# Patient Record
Sex: FEMALE | Race: ASIAN | NOT HISPANIC OR LATINO | Employment: UNEMPLOYED | URBAN - METROPOLITAN AREA
[De-identification: names, ages, dates, MRNs, and addresses within clinical notes are randomized per-mention and may not be internally consistent; named-entity substitution may affect disease eponyms.]

---

## 2019-11-15 ENCOUNTER — OFFICE VISIT (OUTPATIENT)
Dept: OBGYN CLINIC | Facility: CLINIC | Age: 49
End: 2019-11-15
Payer: COMMERCIAL

## 2019-11-15 VITALS
BODY MASS INDEX: 31.51 KG/M2 | HEIGHT: 64 IN | SYSTOLIC BLOOD PRESSURE: 132 MMHG | HEART RATE: 71 BPM | WEIGHT: 184.6 LBS | DIASTOLIC BLOOD PRESSURE: 83 MMHG

## 2019-11-15 DIAGNOSIS — M76.32 IT BAND SYNDROME, LEFT: ICD-10-CM

## 2019-11-15 DIAGNOSIS — M25.562 LEFT KNEE PAIN, UNSPECIFIED CHRONICITY: Primary | ICD-10-CM

## 2019-11-15 PROCEDURE — 99203 OFFICE O/P NEW LOW 30 MIN: CPT | Performed by: ORTHOPAEDIC SURGERY

## 2019-11-15 RX ORDER — DIPHENOXYLATE HYDROCHLORIDE AND ATROPINE SULFATE 2.5; .025 MG/1; MG/1
1 TABLET ORAL DAILY
COMMUNITY

## 2019-11-15 RX ORDER — MELATONIN
1000 DAILY
COMMUNITY

## 2019-11-15 RX ORDER — METFORMIN HYDROCHLORIDE 500 MG/1
TABLET, EXTENDED RELEASE ORAL
Refills: 1 | COMMUNITY
Start: 2019-11-01

## 2020-10-26 LAB
COLOGUARD RESULT REPORTABLE: NEGATIVE
EXTERNAL COLOGUARD RESULT: NEGATIVE

## 2020-10-30 ENCOUNTER — OFFICE VISIT (OUTPATIENT)
Dept: CARDIOLOGY CLINIC | Facility: CLINIC | Age: 50
End: 2020-10-30
Payer: COMMERCIAL

## 2020-10-30 VITALS
TEMPERATURE: 98.7 F | DIASTOLIC BLOOD PRESSURE: 88 MMHG | HEIGHT: 64 IN | SYSTOLIC BLOOD PRESSURE: 128 MMHG | WEIGHT: 187 LBS | HEART RATE: 86 BPM | BODY MASS INDEX: 31.92 KG/M2 | OXYGEN SATURATION: 98 %

## 2020-10-30 DIAGNOSIS — R94.31 ABNORMAL EKG: Primary | ICD-10-CM

## 2020-10-30 DIAGNOSIS — E78.5 HYPERLIPIDEMIA, UNSPECIFIED HYPERLIPIDEMIA TYPE: ICD-10-CM

## 2020-10-30 DIAGNOSIS — Z82.49 FAMILY HISTORY OF EARLY CAD: ICD-10-CM

## 2020-10-30 PROCEDURE — 93000 ELECTROCARDIOGRAM COMPLETE: CPT | Performed by: INTERNAL MEDICINE

## 2020-10-30 PROCEDURE — 99244 OFF/OP CNSLTJ NEW/EST MOD 40: CPT | Performed by: INTERNAL MEDICINE

## 2020-10-30 RX ORDER — ATORVASTATIN CALCIUM 20 MG/1
20 TABLET, FILM COATED ORAL
COMMUNITY
Start: 2020-10-09 | End: 2021-02-23

## 2020-10-30 RX ORDER — IBUPROFEN 600 MG/1
600 TABLET ORAL DAILY
COMMUNITY

## 2020-11-16 ENCOUNTER — HOSPITAL ENCOUNTER (OUTPATIENT)
Dept: NON INVASIVE DIAGNOSTICS | Facility: HOSPITAL | Age: 50
Discharge: HOME | End: 2020-11-16
Attending: INTERNAL MEDICINE
Payer: COMMERCIAL

## 2020-11-16 DIAGNOSIS — E78.5 HYPERLIPIDEMIA, UNSPECIFIED HYPERLIPIDEMIA TYPE: ICD-10-CM

## 2020-11-16 DIAGNOSIS — R94.31 ABNORMAL EKG: ICD-10-CM

## 2020-11-16 LAB
CHEST PAIN STATEMENT: NORMAL
MAX DIASTOLIC BP: 90 MMHG
MAX HEART RATE: 166 BPM
MAX PREDICTED HEART RATE: 169 BPM
MAX. SYSTOLIC BP: 160 MMHG
PROTOCOL NAME: NORMAL
TARGET HR FORMULA: NORMAL
TEST INDICATION: NORMAL
TIME IN EXERCISE PHASE: NORMAL

## 2020-11-16 PROCEDURE — 93018 CV STRESS TEST I&R ONLY: CPT | Performed by: INTERNAL MEDICINE

## 2020-11-16 PROCEDURE — 93016 CV STRESS TEST SUPVJ ONLY: CPT | Performed by: INTERNAL MEDICINE

## 2020-11-16 PROCEDURE — 93017 CV STRESS TEST TRACING ONLY: CPT

## 2020-11-17 ENCOUNTER — TELEPHONE (OUTPATIENT)
Dept: CARDIOLOGY CLINIC | Facility: CLINIC | Age: 50
End: 2020-11-17

## 2020-11-25 ENCOUNTER — TELEPHONE (OUTPATIENT)
Dept: CARDIOLOGY CLINIC | Facility: CLINIC | Age: 50
End: 2020-11-25

## 2020-12-03 ENCOUNTER — HOSPITAL ENCOUNTER (OUTPATIENT)
Dept: CT IMAGING | Facility: HOSPITAL | Age: 50
Discharge: HOME | End: 2020-12-03
Attending: INTERNAL MEDICINE
Payer: COMMERCIAL

## 2020-12-03 DIAGNOSIS — Z82.49 FAMILY HISTORY OF EARLY CAD: ICD-10-CM

## 2020-12-03 DIAGNOSIS — R94.31 ABNORMAL EKG: ICD-10-CM

## 2020-12-03 DIAGNOSIS — E78.5 HYPERLIPIDEMIA, UNSPECIFIED HYPERLIPIDEMIA TYPE: ICD-10-CM

## 2020-12-03 PROCEDURE — 75571 CT HRT W/O DYE W/CA TEST: CPT

## 2021-01-06 ENCOUNTER — TELEPHONE (OUTPATIENT)
Dept: CARDIOLOGY CLINIC | Facility: CLINIC | Age: 51
End: 2021-01-06

## 2021-01-06 DIAGNOSIS — R94.31 ABNORMAL EKG: ICD-10-CM

## 2021-01-06 DIAGNOSIS — E78.5 HYPERLIPIDEMIA, UNSPECIFIED HYPERLIPIDEMIA TYPE: Primary | ICD-10-CM

## 2021-01-07 PROBLEM — E11.9 TYPE 2 DIABETES MELLITUS WITHOUT COMPLICATION (HCC): Status: ACTIVE | Noted: 2021-01-07

## 2021-01-07 NOTE — TELEPHONE ENCOUNTER
Given her diabetes hx and strong family hx I do recommend low dose statin  Preference for rosuvastatin over atorvastatin as it interferes less with sugar   Target LDL below 70

## 2021-01-12 NOTE — TELEPHONE ENCOUNTER
I would like her to recheck her lipids  I have provided the lab sheets  Based on repeat lipids we can dose her rosuvastatin

## 2021-02-23 DIAGNOSIS — E78.5 HYPERLIPIDEMIA, UNSPECIFIED HYPERLIPIDEMIA TYPE: Primary | ICD-10-CM

## 2021-02-23 LAB
ALBUMIN SERPL-MCNC: 4.4 G/DL (ref 3.8–4.9)
ALP SERPL-CCNC: 79 IU/L (ref 39–117)
ALT SERPL-CCNC: 15 IU/L (ref 0–32)
AST SERPL-CCNC: 16 IU/L (ref 0–40)
BILIRUB DIRECT SERPL-MCNC: 0.09 MG/DL (ref 0–0.4)
BILIRUB SERPL-MCNC: 0.3 MG/DL (ref 0–1.2)
CHOLEST SERPL-MCNC: 213 MG/DL (ref 100–199)
HDLC SERPL-MCNC: 44 MG/DL
LDLC SERPL CALC-MCNC: 148 MG/DL (ref 0–99)
LDLC/HDLC SERPL: 3.4 RATIO (ref 0–3.2)
PROT SERPL-MCNC: 7 G/DL (ref 6–8.5)
SL AMB VLDL CHOLESTEROL CALC: 21 MG/DL (ref 5–40)
TRIGL SERPL-MCNC: 116 MG/DL (ref 0–149)

## 2021-02-23 RX ORDER — ROSUVASTATIN CALCIUM 20 MG/1
20 TABLET, COATED ORAL
Qty: 90 TABLET | Refills: 3 | Status: SHIPPED | OUTPATIENT
Start: 2021-02-23

## 2021-02-24 ENCOUNTER — TELEPHONE (OUTPATIENT)
Dept: CARDIOLOGY CLINIC | Facility: CLINIC | Age: 51
End: 2021-02-24

## 2021-02-24 NOTE — TELEPHONE ENCOUNTER
----- Message from Sierra Brand MD sent at 2/24/2021  3:44 PM EST -----  I recommend she have her lipid recheck in six months and followup with primary or me

## 2021-02-24 NOTE — TELEPHONE ENCOUNTER
----- Message from Thomas Reyes MD sent at 2/23/2021  4:31 PM EST -----  Her LDL is still high at 148  Other than modifying her diet  I will switch her from lipitor to crestor 20mg

## 2021-03-10 DIAGNOSIS — Z23 ENCOUNTER FOR IMMUNIZATION: ICD-10-CM

## 2021-10-08 NOTE — PROGRESS NOTES
Assessment/Plan:  1  Left knee pain, unspecified chronicity     2  It band syndrome, left  Ambulatory referral to Physical Therapy       Scribe Attestation    I,:   Cathryn Thomas am acting as a scribe while in the presence of the attending physician :        I,:   George Farmer MD personally performed the services described in this documentation    as scribed in my presence :              Minnie Logan is presenting with signs and symptoms consistent with left knee IT band syndrome  I feel this with well with physical therapy  I did provide her a 6 week prescription though she may find she can do most of her exercises at home after being instructed by her therapist   Should she not show improvement in a week she can return to see me we will consider an MRI questioning a lateral meniscus tear  Due to the exacerbating factors I feel this is more of a tendinitis than a meniscal derangement  She can follow up with me as needed for this  Subjective:   Adrian Foster is a 47 y o  female who presents to the office today for evaluation of her left knee  She states 3 months ago she was lifting a box and performed the pivoting type movement causing pain in the lateral aspect of her left knee  She states since that injury she has been experiencing intermittent discomfort laterally that is described as a burning sensation  Most recently, 1 month ago while descending stairs carrying laundry and with a similar mechanism to the pain returned  Prolonged sitting in a car will cause a burning type sensation  She describes this intermittent pain is mild-to-moderate that will radiate into the lateral proximal thigh  Sitting in a butterfly pose also exacerbates her pain  She is better at rest and denies distal paresthesias  Further previous knee history includes a fall as a child while rollerblading her she fell directly onto the anterior aspect of the knee  She states this bothered her for up to a year        Review of Systems   Constitutional: Positive for activity change  Negative for chills, fever and unexpected weight change  HENT: Negative for hearing loss, nosebleeds and sore throat  Eyes: Negative for pain, redness and visual disturbance  Respiratory: Negative for cough, shortness of breath and wheezing  Cardiovascular: Negative for chest pain, palpitations and leg swelling  Gastrointestinal: Negative for abdominal pain, nausea and vomiting  Endocrine: Negative for polydipsia and polyuria  Genitourinary: Negative for dysuria and hematuria  Musculoskeletal:        See HPI   Skin: Negative for rash and wound  Neurological: Negative for dizziness, numbness and headaches  Psychiatric/Behavioral: Negative for decreased concentration and suicidal ideas  The patient is not nervous/anxious            Past Medical History:   Diagnosis Date    Diabetes [facility])        Past Surgical History:   Procedure Laterality Date     SECTION         Family History   Problem Relation Age of Onset    Diabetes Mother     Parkinsonism Mother     Heart disease Father     No Known Problems Sister     No Known Problems Brother     No Known Problems Maternal Aunt     No Known Problems Maternal Uncle     No Known Problems Paternal Aunt     No Known Problems Paternal Uncle     No Known Problems Maternal Grandmother     No Known Problems Maternal Grandfather     No Known Problems Paternal Grandmother     No Known Problems Paternal Grandfather        Social History     Occupational History    Not on file   Tobacco Use    Smoking status: Never Smoker    Smokeless tobacco: Never Used   Substance and Sexual Activity    Alcohol use: Never     Frequency: Never    Drug use: Never    Sexual activity: Not on file         Current Outpatient Medications:     cholecalciferol (VITAMIN D3) 1,000 units tablet, Take 1,000 Units by mouth daily, Disp: , Rfl:     IRON, FERROUS SULFATE, PO, Take by mouth, Disp: , Rfl:    metFORMIN (GLUCOPHAGE-XR) 500 mg 24 hr tablet, TAKE 3 TABS (1,500 MG) BY MOUTH DAILY (WITH BREAKFAST), Disp: , Rfl: 1    multivitamin (THERAGRAN) TABS, Take 1 tablet by mouth daily, Disp: , Rfl:     No Known Allergies    Objective:  Vitals:    11/15/19 0850   BP: 132/83   Pulse: 71       Left Knee Exam     Tenderness   The patient is experiencing tenderness in the lateral joint line (Posterior lateral joint line, Gerdy's tubercle)  Range of Motion   Extension: 5   Flexion: 140     Tests   Wilder:  Medial - negative Lateral - negative  Varus: negative Valgus: negative  Lachman:  Anterior - negative    Posterior - negative  Drawer:  Anterior - negative     Posterior - negative    Other   Erythema: absent  Scars: absent  Sensation: normal  Swelling: none  Effusion: no effusion present          Observations   Left Knee   Negative for effusion  Physical Exam   Constitutional: She is oriented to person, place, and time  She appears well-developed and well-nourished  HENT:   Head: Normocephalic and atraumatic  Eyes: Conjunctivae are normal  Right eye exhibits no discharge  Left eye exhibits no discharge  Neck: Normal range of motion  Neck supple  Cardiovascular: Regular rhythm and intact distal pulses  Pulmonary/Chest: Effort normal  No stridor  No respiratory distress  Musculoskeletal:        Left knee: She exhibits no effusion  Neurological: She is alert and oriented to person, place, and time  Skin: Skin is warm and dry  Psychiatric: She has a normal mood and affect  Her behavior is normal    Vitals reviewed  I have personally reviewed pertinent films in PACS and my interpretation is as follows:   X-rays of the left knee from an outside source demonstrate mild degenerative changes and evidence of a bipartite patella or possible old injury  Trevor Melo well appearing

## 2022-02-24 ENCOUNTER — APPOINTMENT (EMERGENCY)
Dept: RADIOLOGY | Facility: HOSPITAL | Age: 52
End: 2022-02-24
Payer: COMMERCIAL

## 2022-02-24 ENCOUNTER — HOSPITAL ENCOUNTER (EMERGENCY)
Facility: HOSPITAL | Age: 52
Discharge: HOME | End: 2022-02-24
Attending: EMERGENCY MEDICINE
Payer: COMMERCIAL

## 2022-02-24 VITALS
HEIGHT: 64 IN | OXYGEN SATURATION: 96 % | SYSTOLIC BLOOD PRESSURE: 109 MMHG | BODY MASS INDEX: 29.02 KG/M2 | HEART RATE: 76 BPM | WEIGHT: 170 LBS | TEMPERATURE: 98.9 F | RESPIRATION RATE: 20 BRPM | DIASTOLIC BLOOD PRESSURE: 64 MMHG

## 2022-02-24 DIAGNOSIS — K21.9 ACID REFLUX: ICD-10-CM

## 2022-02-24 DIAGNOSIS — R07.89 ATYPICAL CHEST PAIN: Primary | ICD-10-CM

## 2022-02-24 LAB
ALBUMIN SERPL BCP-MCNC: 4 G/DL (ref 3.5–5)
ALP SERPL-CCNC: 78 U/L (ref 46–116)
ALT SERPL W P-5'-P-CCNC: 24 U/L (ref 12–78)
ANION GAP SERPL CALCULATED.3IONS-SCNC: 12 MMOL/L (ref 4–13)
AST SERPL W P-5'-P-CCNC: 12 U/L (ref 5–45)
BASOPHILS # BLD AUTO: 0.04 THOUSANDS/ΜL (ref 0–0.1)
BASOPHILS NFR BLD AUTO: 0 % (ref 0–1)
BILIRUB DIRECT SERPL-MCNC: 0.16 MG/DL (ref 0–0.2)
BILIRUB SERPL-MCNC: 0.68 MG/DL (ref 0.2–1)
BUN SERPL-MCNC: 7 MG/DL (ref 5–25)
CALCIUM SERPL-MCNC: 9.1 MG/DL (ref 8.3–10.1)
CARDIAC TROPONIN I PNL SERPL HS: <2 NG/L
CHLORIDE SERPL-SCNC: 103 MMOL/L (ref 100–108)
CO2 SERPL-SCNC: 26 MMOL/L (ref 21–32)
CREAT SERPL-MCNC: 0.48 MG/DL (ref 0.6–1.3)
D DIMER PPP FEU-MCNC: 0.52 UG/ML FEU
EOSINOPHIL # BLD AUTO: 0.05 THOUSAND/ΜL (ref 0–0.61)
EOSINOPHIL NFR BLD AUTO: 1 % (ref 0–6)
ERYTHROCYTE [DISTWIDTH] IN BLOOD BY AUTOMATED COUNT: 12.9 % (ref 11.6–15.1)
GFR SERPL CREATININE-BSD FRML MDRD: 113 ML/MIN/1.73SQ M
GLUCOSE SERPL-MCNC: 153 MG/DL (ref 65–140)
HCG SERPL QL: NEGATIVE
HCT VFR BLD AUTO: 39 % (ref 34.8–46.1)
HGB BLD-MCNC: 12.3 G/DL (ref 11.5–15.4)
IMM GRANULOCYTES # BLD AUTO: 0.03 THOUSAND/UL (ref 0–0.2)
IMM GRANULOCYTES NFR BLD AUTO: 0 % (ref 0–2)
LYMPHOCYTES # BLD AUTO: 1.36 THOUSANDS/ΜL (ref 0.6–4.47)
LYMPHOCYTES NFR BLD AUTO: 15 % (ref 14–44)
MCH RBC QN AUTO: 26.5 PG (ref 26.8–34.3)
MCHC RBC AUTO-ENTMCNC: 31.5 G/DL (ref 31.4–37.4)
MCV RBC AUTO: 84 FL (ref 82–98)
MONOCYTES # BLD AUTO: 0.36 THOUSAND/ΜL (ref 0.17–1.22)
MONOCYTES NFR BLD AUTO: 4 % (ref 4–12)
NEUTROPHILS # BLD AUTO: 7.44 THOUSANDS/ΜL (ref 1.85–7.62)
NEUTS SEG NFR BLD AUTO: 80 % (ref 43–75)
NRBC BLD AUTO-RTO: 0 /100 WBCS
PLATELET # BLD AUTO: 278 THOUSANDS/UL (ref 149–390)
PMV BLD AUTO: 11 FL (ref 8.9–12.7)
POTASSIUM SERPL-SCNC: 3.8 MMOL/L (ref 3.5–5.3)
PROT SERPL-MCNC: 7.9 G/DL (ref 6.4–8.2)
RBC # BLD AUTO: 4.65 MILLION/UL (ref 3.81–5.12)
SODIUM SERPL-SCNC: 141 MMOL/L (ref 136–145)
WBC # BLD AUTO: 9.28 THOUSAND/UL (ref 4.31–10.16)

## 2022-02-24 PROCEDURE — 71045 X-RAY EXAM CHEST 1 VIEW: CPT

## 2022-02-24 PROCEDURE — 99285 EMERGENCY DEPT VISIT HI MDM: CPT | Performed by: EMERGENCY MEDICINE

## 2022-02-24 PROCEDURE — 99285 EMERGENCY DEPT VISIT HI MDM: CPT

## 2022-02-24 PROCEDURE — 36415 COLL VENOUS BLD VENIPUNCTURE: CPT | Performed by: EMERGENCY MEDICINE

## 2022-02-24 PROCEDURE — 93005 ELECTROCARDIOGRAM TRACING: CPT

## 2022-02-24 PROCEDURE — 96375 TX/PRO/DX INJ NEW DRUG ADDON: CPT

## 2022-02-24 PROCEDURE — 80053 COMPREHEN METABOLIC PANEL: CPT | Performed by: EMERGENCY MEDICINE

## 2022-02-24 PROCEDURE — 85025 COMPLETE CBC W/AUTO DIFF WBC: CPT | Performed by: EMERGENCY MEDICINE

## 2022-02-24 PROCEDURE — 82248 BILIRUBIN DIRECT: CPT | Performed by: EMERGENCY MEDICINE

## 2022-02-24 PROCEDURE — 84703 CHORIONIC GONADOTROPIN ASSAY: CPT | Performed by: EMERGENCY MEDICINE

## 2022-02-24 PROCEDURE — 84484 ASSAY OF TROPONIN QUANT: CPT | Performed by: EMERGENCY MEDICINE

## 2022-02-24 PROCEDURE — 85379 FIBRIN DEGRADATION QUANT: CPT | Performed by: EMERGENCY MEDICINE

## 2022-02-24 PROCEDURE — 96374 THER/PROPH/DIAG INJ IV PUSH: CPT

## 2022-02-24 RX ORDER — KETOROLAC TROMETHAMINE 30 MG/ML
15 INJECTION, SOLUTION INTRAMUSCULAR; INTRAVENOUS ONCE
Status: COMPLETED | OUTPATIENT
Start: 2022-02-24 | End: 2022-02-24

## 2022-02-24 RX ORDER — SUCRALFATE 1 G/1
1 TABLET ORAL ONCE
Status: COMPLETED | OUTPATIENT
Start: 2022-02-24 | End: 2022-02-24

## 2022-02-24 RX ORDER — MAGNESIUM HYDROXIDE/ALUMINUM HYDROXICE/SIMETHICONE 120; 1200; 1200 MG/30ML; MG/30ML; MG/30ML
30 SUSPENSION ORAL ONCE
Status: COMPLETED | OUTPATIENT
Start: 2022-02-24 | End: 2022-02-24

## 2022-02-24 RX ORDER — FAMOTIDINE 20 MG/1
20 TABLET, FILM COATED ORAL 2 TIMES DAILY
Qty: 30 TABLET | Refills: 0 | Status: SHIPPED | OUTPATIENT
Start: 2022-02-24

## 2022-02-24 RX ORDER — ONDANSETRON 2 MG/ML
4 INJECTION INTRAMUSCULAR; INTRAVENOUS ONCE
Status: COMPLETED | OUTPATIENT
Start: 2022-02-24 | End: 2022-02-24

## 2022-02-24 RX ORDER — NITROGLYCERIN 0.4 MG/1
0.4 TABLET SUBLINGUAL ONCE
Status: COMPLETED | OUTPATIENT
Start: 2022-02-24 | End: 2022-02-24

## 2022-02-24 RX ORDER — LIDOCAINE HYDROCHLORIDE 20 MG/ML
15 SOLUTION OROPHARYNGEAL ONCE
Status: COMPLETED | OUTPATIENT
Start: 2022-02-24 | End: 2022-02-24

## 2022-02-24 RX ORDER — METOCLOPRAMIDE HYDROCHLORIDE 5 MG/ML
10 INJECTION INTRAMUSCULAR; INTRAVENOUS ONCE
Status: COMPLETED | OUTPATIENT
Start: 2022-02-24 | End: 2022-02-24

## 2022-02-24 RX ADMIN — ONDANSETRON 4 MG: 2 INJECTION INTRAMUSCULAR; INTRAVENOUS at 05:06

## 2022-02-24 RX ADMIN — ALUMINA, MAGNESIA, AND SIMETHICONE ORAL SUSPENSION REGULAR STRENGTH 30 ML: 1200; 1200; 120 SUSPENSION ORAL at 05:26

## 2022-02-24 RX ADMIN — SUCRALFATE 1 G: 1 TABLET ORAL at 05:26

## 2022-02-24 RX ADMIN — METOCLOPRAMIDE 10 MG: 5 INJECTION, SOLUTION INTRAMUSCULAR; INTRAVENOUS at 05:25

## 2022-02-24 RX ADMIN — LIDOCAINE HYDROCHLORIDE 15 ML: 20 SOLUTION ORAL; TOPICAL at 05:26

## 2022-02-24 RX ADMIN — KETOROLAC TROMETHAMINE 15 MG: 30 INJECTION, SOLUTION INTRAMUSCULAR at 05:08

## 2022-02-24 RX ADMIN — NITROGLYCERIN 0.4 MG: 0.4 TABLET SUBLINGUAL at 05:09

## 2022-02-25 ENCOUNTER — HOSPITAL ENCOUNTER (OUTPATIENT)
Facility: HOSPITAL | Age: 52
Setting detail: OUTPATIENT SURGERY
Discharge: HOME | End: 2022-02-26
Attending: EMERGENCY MEDICINE | Admitting: SURGERY
Payer: COMMERCIAL

## 2022-02-25 ENCOUNTER — ANESTHESIA EVENT (OUTPATIENT)
Dept: PERIOP | Facility: HOSPITAL | Age: 52
End: 2022-02-25
Payer: COMMERCIAL

## 2022-02-25 ENCOUNTER — APPOINTMENT (EMERGENCY)
Dept: RADIOLOGY | Facility: HOSPITAL | Age: 52
End: 2022-02-25
Payer: COMMERCIAL

## 2022-02-25 ENCOUNTER — ANESTHESIA (OUTPATIENT)
Dept: PERIOP | Facility: HOSPITAL | Age: 52
End: 2022-02-25
Payer: COMMERCIAL

## 2022-02-25 DIAGNOSIS — K81.0 ACUTE CHOLECYSTITIS: Primary | ICD-10-CM

## 2022-02-25 DIAGNOSIS — K80.20 GALLSTONES: ICD-10-CM

## 2022-02-25 LAB
ALBUMIN SERPL BCP-MCNC: 4.1 G/DL (ref 3.5–5)
ALP SERPL-CCNC: 85 U/L (ref 46–116)
ALT SERPL W P-5'-P-CCNC: 20 U/L (ref 12–78)
ANION GAP SERPL CALCULATED.3IONS-SCNC: 10 MMOL/L (ref 4–13)
AST SERPL W P-5'-P-CCNC: 11 U/L (ref 5–45)
BASOPHILS # BLD AUTO: 0.02 THOUSANDS/ΜL (ref 0–0.1)
BASOPHILS NFR BLD AUTO: 0 % (ref 0–1)
BILIRUB SERPL-MCNC: 0.82 MG/DL (ref 0.2–1)
BUN SERPL-MCNC: 7 MG/DL (ref 5–25)
CALCIUM SERPL-MCNC: 9.2 MG/DL (ref 8.3–10.1)
CARDIAC TROPONIN I PNL SERPL HS: <2 NG/L
CHLORIDE SERPL-SCNC: 104 MMOL/L (ref 100–108)
CO2 SERPL-SCNC: 26 MMOL/L (ref 21–32)
CREAT SERPL-MCNC: 0.61 MG/DL (ref 0.6–1.3)
EOSINOPHIL # BLD AUTO: 0 THOUSAND/ΜL (ref 0–0.61)
EOSINOPHIL NFR BLD AUTO: 0 % (ref 0–6)
ERYTHROCYTE [DISTWIDTH] IN BLOOD BY AUTOMATED COUNT: 13.2 % (ref 11.6–15.1)
FLUAV RNA RESP QL NAA+PROBE: NEGATIVE
FLUBV RNA RESP QL NAA+PROBE: NEGATIVE
GFR SERPL CREATININE-BSD FRML MDRD: 104 ML/MIN/1.73SQ M
GLUCOSE SERPL-MCNC: 107 MG/DL (ref 65–140)
GLUCOSE SERPL-MCNC: 128 MG/DL (ref 65–140)
GLUCOSE SERPL-MCNC: 155 MG/DL (ref 65–140)
GLUCOSE SERPL-MCNC: 165 MG/DL (ref 65–140)
HCT VFR BLD AUTO: 38.9 % (ref 34.8–46.1)
HGB BLD-MCNC: 12.2 G/DL (ref 11.5–15.4)
IMM GRANULOCYTES # BLD AUTO: 0.03 THOUSAND/UL (ref 0–0.2)
IMM GRANULOCYTES NFR BLD AUTO: 0 % (ref 0–2)
LIPASE SERPL-CCNC: 166 U/L (ref 73–393)
LYMPHOCYTES # BLD AUTO: 1.39 THOUSANDS/ΜL (ref 0.6–4.47)
LYMPHOCYTES NFR BLD AUTO: 15 % (ref 14–44)
MCH RBC QN AUTO: 26.4 PG (ref 26.8–34.3)
MCHC RBC AUTO-ENTMCNC: 31.4 G/DL (ref 31.4–37.4)
MCV RBC AUTO: 84 FL (ref 82–98)
MONOCYTES # BLD AUTO: 0.52 THOUSAND/ΜL (ref 0.17–1.22)
MONOCYTES NFR BLD AUTO: 5 % (ref 4–12)
NEUTROPHILS # BLD AUTO: 7.63 THOUSANDS/ΜL (ref 1.85–7.62)
NEUTS SEG NFR BLD AUTO: 80 % (ref 43–75)
NRBC BLD AUTO-RTO: 0 /100 WBCS
PLATELET # BLD AUTO: 287 THOUSANDS/UL (ref 149–390)
PMV BLD AUTO: 11.2 FL (ref 8.9–12.7)
POTASSIUM SERPL-SCNC: 3.5 MMOL/L (ref 3.5–5.3)
PROT SERPL-MCNC: 8.2 G/DL (ref 6.4–8.2)
RBC # BLD AUTO: 4.62 MILLION/UL (ref 3.81–5.12)
RSV RNA RESP QL NAA+PROBE: NEGATIVE
SARS-COV-2 RNA RESP QL NAA+PROBE: NEGATIVE
SODIUM SERPL-SCNC: 140 MMOL/L (ref 136–145)
WBC # BLD AUTO: 9.59 THOUSAND/UL (ref 4.31–10.16)

## 2022-02-25 PROCEDURE — 83690 ASSAY OF LIPASE: CPT | Performed by: EMERGENCY MEDICINE

## 2022-02-25 PROCEDURE — 99285 EMERGENCY DEPT VISIT HI MDM: CPT

## 2022-02-25 PROCEDURE — 99220 PR INITIAL OBSERVATION CARE/DAY 70 MINUTES: CPT | Performed by: SURGERY

## 2022-02-25 PROCEDURE — G1004 CDSM NDSC: HCPCS

## 2022-02-25 PROCEDURE — 96361 HYDRATE IV INFUSION ADD-ON: CPT

## 2022-02-25 PROCEDURE — 88304 TISSUE EXAM BY PATHOLOGIST: CPT | Performed by: SPECIALIST

## 2022-02-25 PROCEDURE — 47562 LAPAROSCOPIC CHOLECYSTECTOMY: CPT | Performed by: SURGERY

## 2022-02-25 PROCEDURE — 47562 LAPAROSCOPIC CHOLECYSTECTOMY: CPT | Performed by: PHYSICIAN ASSISTANT

## 2022-02-25 PROCEDURE — 0241U HB NFCT DS VIR RESP RNA 4 TRGT: CPT | Performed by: EMERGENCY MEDICINE

## 2022-02-25 PROCEDURE — 36415 COLL VENOUS BLD VENIPUNCTURE: CPT

## 2022-02-25 PROCEDURE — 96374 THER/PROPH/DIAG INJ IV PUSH: CPT

## 2022-02-25 PROCEDURE — NC001 PR NO CHARGE: Performed by: SURGERY

## 2022-02-25 PROCEDURE — 96375 TX/PRO/DX INJ NEW DRUG ADDON: CPT

## 2022-02-25 PROCEDURE — 74177 CT ABD & PELVIS W/CONTRAST: CPT

## 2022-02-25 PROCEDURE — 96372 THER/PROPH/DIAG INJ SC/IM: CPT

## 2022-02-25 PROCEDURE — 82948 REAGENT STRIP/BLOOD GLUCOSE: CPT

## 2022-02-25 PROCEDURE — 99285 EMERGENCY DEPT VISIT HI MDM: CPT | Performed by: EMERGENCY MEDICINE

## 2022-02-25 PROCEDURE — 85025 COMPLETE CBC W/AUTO DIFF WBC: CPT | Performed by: EMERGENCY MEDICINE

## 2022-02-25 PROCEDURE — 71275 CT ANGIOGRAPHY CHEST: CPT

## 2022-02-25 PROCEDURE — 84484 ASSAY OF TROPONIN QUANT: CPT | Performed by: EMERGENCY MEDICINE

## 2022-02-25 PROCEDURE — 93005 ELECTROCARDIOGRAM TRACING: CPT

## 2022-02-25 PROCEDURE — 80053 COMPREHEN METABOLIC PANEL: CPT | Performed by: EMERGENCY MEDICINE

## 2022-02-25 RX ORDER — ONDANSETRON 2 MG/ML
4 INJECTION INTRAMUSCULAR; INTRAVENOUS ONCE AS NEEDED
Status: DISCONTINUED | OUTPATIENT
Start: 2022-02-25 | End: 2022-02-25 | Stop reason: HOSPADM

## 2022-02-25 RX ORDER — ONDANSETRON 2 MG/ML
INJECTION INTRAMUSCULAR; INTRAVENOUS AS NEEDED
Status: DISCONTINUED | OUTPATIENT
Start: 2022-02-25 | End: 2022-02-25

## 2022-02-25 RX ORDER — MORPHINE SULFATE 4 MG/ML
4 INJECTION, SOLUTION INTRAMUSCULAR; INTRAVENOUS ONCE
Status: COMPLETED | OUTPATIENT
Start: 2022-02-25 | End: 2022-02-25

## 2022-02-25 RX ORDER — FENTANYL CITRATE 50 UG/ML
INJECTION, SOLUTION INTRAMUSCULAR; INTRAVENOUS AS NEEDED
Status: DISCONTINUED | OUTPATIENT
Start: 2022-02-25 | End: 2022-02-25

## 2022-02-25 RX ORDER — ONDANSETRON 2 MG/ML
INJECTION INTRAMUSCULAR; INTRAVENOUS
Status: COMPLETED
Start: 2022-02-25 | End: 2022-02-25

## 2022-02-25 RX ORDER — HYDROMORPHONE HCL/PF 1 MG/ML
0.5 SYRINGE (ML) INJECTION
Status: DISCONTINUED | OUTPATIENT
Start: 2022-02-25 | End: 2022-02-26 | Stop reason: HOSPADM

## 2022-02-25 RX ORDER — LIDOCAINE HYDROCHLORIDE 20 MG/ML
15 SOLUTION OROPHARYNGEAL ONCE
Status: COMPLETED | OUTPATIENT
Start: 2022-02-25 | End: 2022-02-25

## 2022-02-25 RX ORDER — PRAVASTATIN SODIUM 40 MG
40 TABLET ORAL
Status: DISCONTINUED | OUTPATIENT
Start: 2022-02-25 | End: 2022-02-26 | Stop reason: HOSPADM

## 2022-02-25 RX ORDER — NEOSTIGMINE METHYLSULFATE 1 MG/ML
INJECTION INTRAVENOUS AS NEEDED
Status: DISCONTINUED | OUTPATIENT
Start: 2022-02-25 | End: 2022-02-25

## 2022-02-25 RX ORDER — MAGNESIUM HYDROXIDE/ALUMINUM HYDROXICE/SIMETHICONE 120; 1200; 1200 MG/30ML; MG/30ML; MG/30ML
30 SUSPENSION ORAL ONCE
Status: DISCONTINUED | OUTPATIENT
Start: 2022-02-25 | End: 2022-02-26 | Stop reason: HOSPADM

## 2022-02-25 RX ORDER — KETOROLAC TROMETHAMINE 30 MG/ML
15 INJECTION, SOLUTION INTRAMUSCULAR; INTRAVENOUS EVERY 6 HOURS SCHEDULED
Status: DISCONTINUED | OUTPATIENT
Start: 2022-02-25 | End: 2022-02-26 | Stop reason: HOSPADM

## 2022-02-25 RX ORDER — BUPIVACAINE HYDROCHLORIDE AND EPINEPHRINE 2.5; 5 MG/ML; UG/ML
INJECTION, SOLUTION INFILTRATION; PERINEURAL AS NEEDED
Status: DISCONTINUED | OUTPATIENT
Start: 2022-02-25 | End: 2022-02-25 | Stop reason: HOSPADM

## 2022-02-25 RX ORDER — MAGNESIUM HYDROXIDE 1200 MG/15ML
LIQUID ORAL AS NEEDED
Status: DISCONTINUED | OUTPATIENT
Start: 2022-02-25 | End: 2022-02-25 | Stop reason: HOSPADM

## 2022-02-25 RX ORDER — ORAL SEMAGLUTIDE 7 MG/1
7 TABLET ORAL DAILY
COMMUNITY

## 2022-02-25 RX ORDER — OXYCODONE HYDROCHLORIDE AND ACETAMINOPHEN 5; 325 MG/1; MG/1
1 TABLET ORAL EVERY 4 HOURS PRN
Status: DISCONTINUED | OUTPATIENT
Start: 2022-02-25 | End: 2022-02-26 | Stop reason: HOSPADM

## 2022-02-25 RX ORDER — SODIUM CHLORIDE 9 MG/ML
125 INJECTION, SOLUTION INTRAVENOUS CONTINUOUS
Status: DISCONTINUED | OUTPATIENT
Start: 2022-02-25 | End: 2022-02-25

## 2022-02-25 RX ORDER — HYDROMORPHONE HCL/PF 1 MG/ML
0.5 SYRINGE (ML) INJECTION
Status: DISCONTINUED | OUTPATIENT
Start: 2022-02-25 | End: 2022-02-25 | Stop reason: HOSPADM

## 2022-02-25 RX ORDER — ROCURONIUM BROMIDE 10 MG/ML
INJECTION, SOLUTION INTRAVENOUS AS NEEDED
Status: DISCONTINUED | OUTPATIENT
Start: 2022-02-25 | End: 2022-02-25

## 2022-02-25 RX ORDER — METOCLOPRAMIDE HYDROCHLORIDE 5 MG/ML
10 INJECTION INTRAMUSCULAR; INTRAVENOUS ONCE
Status: COMPLETED | OUTPATIENT
Start: 2022-02-25 | End: 2022-02-25

## 2022-02-25 RX ORDER — MEPERIDINE HYDROCHLORIDE 25 MG/ML
12.5 INJECTION INTRAMUSCULAR; INTRAVENOUS; SUBCUTANEOUS
Status: DISCONTINUED | OUTPATIENT
Start: 2022-02-25 | End: 2022-02-25 | Stop reason: HOSPADM

## 2022-02-25 RX ORDER — ACETAMINOPHEN 325 MG/1
650 TABLET ORAL EVERY 6 HOURS PRN
Status: DISCONTINUED | OUTPATIENT
Start: 2022-02-25 | End: 2022-02-26 | Stop reason: HOSPADM

## 2022-02-25 RX ORDER — DEXAMETHASONE SODIUM PHOSPHATE 10 MG/ML
INJECTION, SOLUTION INTRAMUSCULAR; INTRAVENOUS AS NEEDED
Status: DISCONTINUED | OUTPATIENT
Start: 2022-02-25 | End: 2022-02-25

## 2022-02-25 RX ORDER — LIDOCAINE HYDROCHLORIDE 10 MG/ML
INJECTION, SOLUTION EPIDURAL; INFILTRATION; INTRACAUDAL; PERINEURAL AS NEEDED
Status: DISCONTINUED | OUTPATIENT
Start: 2022-02-25 | End: 2022-02-25

## 2022-02-25 RX ORDER — FAMOTIDINE 20 MG/1
20 TABLET, FILM COATED ORAL 2 TIMES DAILY
Status: DISCONTINUED | OUTPATIENT
Start: 2022-02-25 | End: 2022-02-26 | Stop reason: HOSPADM

## 2022-02-25 RX ORDER — GLYCOPYRROLATE 0.2 MG/ML
INJECTION INTRAMUSCULAR; INTRAVENOUS AS NEEDED
Status: DISCONTINUED | OUTPATIENT
Start: 2022-02-25 | End: 2022-02-25

## 2022-02-25 RX ORDER — MIDAZOLAM HYDROCHLORIDE 2 MG/2ML
INJECTION, SOLUTION INTRAMUSCULAR; INTRAVENOUS AS NEEDED
Status: DISCONTINUED | OUTPATIENT
Start: 2022-02-25 | End: 2022-02-25

## 2022-02-25 RX ORDER — PROPOFOL 10 MG/ML
INJECTION, EMULSION INTRAVENOUS AS NEEDED
Status: DISCONTINUED | OUTPATIENT
Start: 2022-02-25 | End: 2022-02-25

## 2022-02-25 RX ORDER — SODIUM CHLORIDE, SODIUM LACTATE, POTASSIUM CHLORIDE, CALCIUM CHLORIDE 600; 310; 30; 20 MG/100ML; MG/100ML; MG/100ML; MG/100ML
75 INJECTION, SOLUTION INTRAVENOUS CONTINUOUS
Status: DISCONTINUED | OUTPATIENT
Start: 2022-02-25 | End: 2022-02-26 | Stop reason: HOSPADM

## 2022-02-25 RX ORDER — MELATONIN
1000 DAILY
Status: DISCONTINUED | OUTPATIENT
Start: 2022-02-25 | End: 2022-02-26 | Stop reason: HOSPADM

## 2022-02-25 RX ORDER — METFORMIN HYDROCHLORIDE 500 MG/1
500 TABLET, EXTENDED RELEASE ORAL
Status: DISCONTINUED | OUTPATIENT
Start: 2022-02-26 | End: 2022-02-25

## 2022-02-25 RX ORDER — HEPARIN SODIUM 5000 [USP'U]/ML
5000 INJECTION, SOLUTION INTRAVENOUS; SUBCUTANEOUS EVERY 8 HOURS SCHEDULED
Status: DISCONTINUED | OUTPATIENT
Start: 2022-02-25 | End: 2022-02-25

## 2022-02-25 RX ORDER — FERROUS SULFATE 325(65) MG
325 TABLET ORAL
Status: DISCONTINUED | OUTPATIENT
Start: 2022-02-25 | End: 2022-02-26 | Stop reason: HOSPADM

## 2022-02-25 RX ORDER — SUCRALFATE 1 G/1
1 TABLET ORAL ONCE
Status: COMPLETED | OUTPATIENT
Start: 2022-02-25 | End: 2022-02-25

## 2022-02-25 RX ORDER — OLANZAPINE 10 MG/1
5 INJECTION, POWDER, LYOPHILIZED, FOR SOLUTION INTRAMUSCULAR ONCE
Status: COMPLETED | OUTPATIENT
Start: 2022-02-25 | End: 2022-02-25

## 2022-02-25 RX ORDER — FENTANYL CITRATE/PF 50 MCG/ML
25 SYRINGE (ML) INJECTION
Status: DISCONTINUED | OUTPATIENT
Start: 2022-02-25 | End: 2022-02-25 | Stop reason: HOSPADM

## 2022-02-25 RX ORDER — ONDANSETRON 2 MG/ML
4 INJECTION INTRAMUSCULAR; INTRAVENOUS ONCE
Status: COMPLETED | OUTPATIENT
Start: 2022-02-25 | End: 2022-02-25

## 2022-02-25 RX ADMIN — FENTANYL CITRATE 50 MCG: 50 INJECTION, SOLUTION INTRAMUSCULAR; INTRAVENOUS at 11:01

## 2022-02-25 RX ADMIN — FENTANYL CITRATE 50 MCG: 50 INJECTION, SOLUTION INTRAMUSCULAR; INTRAVENOUS at 10:55

## 2022-02-25 RX ADMIN — ACETAMINOPHEN 650 MG: 325 TABLET, FILM COATED ORAL at 15:48

## 2022-02-25 RX ADMIN — FENTANYL CITRATE 25 MCG: 50 INJECTION, SOLUTION INTRAMUSCULAR; INTRAVENOUS at 12:49

## 2022-02-25 RX ADMIN — MIDAZOLAM 2 MG: 1 INJECTION INTRAMUSCULAR; INTRAVENOUS at 10:52

## 2022-02-25 RX ADMIN — DEXAMETHASONE SODIUM PHOSPHATE 4 MG: 10 INJECTION, SOLUTION INTRAMUSCULAR; INTRAVENOUS at 10:55

## 2022-02-25 RX ADMIN — SUCRALFATE 1 G: 1 TABLET ORAL at 05:57

## 2022-02-25 RX ADMIN — METOCLOPRAMIDE 10 MG: 5 INJECTION, SOLUTION INTRAMUSCULAR; INTRAVENOUS at 06:02

## 2022-02-25 RX ADMIN — FAMOTIDINE 20 MG: 20 TABLET, FILM COATED ORAL at 17:11

## 2022-02-25 RX ADMIN — PIPERACILLIN AND TAZOBACTAM 3.38 G: 3; .375 INJECTION, POWDER, LYOPHILIZED, FOR SOLUTION INTRAVENOUS at 07:35

## 2022-02-25 RX ADMIN — ROCURONIUM BROMIDE 40 MG: 10 INJECTION, SOLUTION INTRAVENOUS at 10:56

## 2022-02-25 RX ADMIN — ROCURONIUM BROMIDE 20 MG: 10 INJECTION, SOLUTION INTRAVENOUS at 11:24

## 2022-02-25 RX ADMIN — KETOROLAC TROMETHAMINE 15 MG: 30 INJECTION, SOLUTION INTRAMUSCULAR at 17:11

## 2022-02-25 RX ADMIN — SODIUM CHLORIDE 1000 ML: 0.9 INJECTION, SOLUTION INTRAVENOUS at 05:23

## 2022-02-25 RX ADMIN — GLYCOPYRROLATE 0.8 MG: 0.2 INJECTION, SOLUTION INTRAMUSCULAR; INTRAVENOUS at 12:01

## 2022-02-25 RX ADMIN — NEOSTIGMINE METHYLSULFATE 5 MG: 1 INJECTION INTRAVENOUS at 12:01

## 2022-02-25 RX ADMIN — SODIUM CHLORIDE, SODIUM LACTATE, POTASSIUM CHLORIDE, AND CALCIUM CHLORIDE 75 ML/HR: .6; .31; .03; .02 INJECTION, SOLUTION INTRAVENOUS at 14:08

## 2022-02-25 RX ADMIN — ROCURONIUM BROMIDE 10 MG: 10 INJECTION, SOLUTION INTRAVENOUS at 11:05

## 2022-02-25 RX ADMIN — LIDOCAINE HYDROCHLORIDE 50 MG: 10 INJECTION, SOLUTION EPIDURAL; INFILTRATION; INTRACAUDAL; PERINEURAL at 10:55

## 2022-02-25 RX ADMIN — PROPOFOL 180 MG: 10 INJECTION, EMULSION INTRAVENOUS at 10:55

## 2022-02-25 RX ADMIN — KETOROLAC TROMETHAMINE 15 MG: 30 INJECTION, SOLUTION INTRAMUSCULAR at 07:39

## 2022-02-25 RX ADMIN — SODIUM CHLORIDE 125 ML/HR: 0.9 INJECTION, SOLUTION INTRAVENOUS at 08:08

## 2022-02-25 RX ADMIN — INSULIN LISPRO 1 UNITS: 100 INJECTION, SOLUTION INTRAVENOUS; SUBCUTANEOUS at 14:08

## 2022-02-25 RX ADMIN — LIDOCAINE HYDROCHLORIDE 15 ML: 20 SOLUTION ORAL; TOPICAL at 05:57

## 2022-02-25 RX ADMIN — IOHEXOL 100 ML: 350 INJECTION, SOLUTION INTRAVENOUS at 06:53

## 2022-02-25 RX ADMIN — OLANZAPINE 5 MG: 10 INJECTION, POWDER, FOR SOLUTION INTRAMUSCULAR at 06:27

## 2022-02-25 RX ADMIN — PIPERACILLIN AND TAZOBACTAM 3.38 G: 36; 4.5 INJECTION, POWDER, FOR SOLUTION INTRAVENOUS at 14:08

## 2022-02-25 RX ADMIN — ONDANSETRON 4 MG: 2 INJECTION INTRAMUSCULAR; INTRAVENOUS at 05:23

## 2022-02-25 RX ADMIN — ONDANSETRON 4 MG: 2 INJECTION INTRAMUSCULAR; INTRAVENOUS at 10:55

## 2022-02-25 RX ADMIN — MORPHINE SULFATE 4 MG: 4 INJECTION INTRAVENOUS at 07:09

## 2022-02-25 RX ADMIN — PRAVASTATIN SODIUM 40 MG: 40 TABLET ORAL at 15:48

## 2022-02-25 NOTE — PROGRESS NOTES
Progress Note - General Surgery   Marli Buitrago 45 y o  female MRN: [id number]  Unit/Bed#: 2 [place] [address] Encounter: [id number]    Assessment:  1) POD #0 s/p lap shilo - improving slowly, still has abdominal tenderness overlying incisions consistent with postoperative tenderness, mild nausea, ambulating, using incentive spirometry mid, tolerating clear liquids    Plan:  1)   - after patient discussion feels more comfortable staying overnight for observation and for pain control  - continue p r n  analgesia  - serial abdominal exam into tomorrow  - planning for discharge tomorrow  - Q shift vital signs  - supplemental IV fluids  - patient Education    Subjective/Objective   Chief Complaint:  I am still a bit sore and still kind of foggy    Subjective:  Patient was seen examined at bedside  Patient denies any acute overnight  Patient denies any fevers or chills  Patient is feeling okay as of right now but still having moderate postoperative tenderness  Patient denies any nausea vomiting  Patient has had sips of water  Patient does not have much of an appetite yet  Patient does have to urinate  Patient has been ambulating minimally  Patient has been using incentive spirometry minimally  Patient expresses that she would feel more comfortable if she was able to stay overnight as she is still having some challenges with her tenderness and mobilizing  Patient is not eat any regular food yet  Objective:     Blood pressure 147/87, pulse (!) 109, temperature (!) 101 2 °F (38 4 °C), resp  rate 14, height 5' 4" (1 626 m), weight 77 1 kg (169 lb 15 6 oz), last menstrual period 10/01/2021, SpO2 (!) 85 %  ,Body mass index is 29 18 kg/m²        Intake/Output Summary (Last 24 hours) at 2/25/2022 1737  Last data filed at 2/25/2022 1308  Gross per 24 hour   Intake 600 ml   Output 0 ml   Net 600 ml       Invasive Devices  Report    Peripheral Intravenous Line            Peripheral IV 02/25/22 Right Antecubital <1 day Physical Exam: /87   Pulse (!) 109   Temp (!) 101 2 °F (38 4 °C)   Resp 14   Ht 5' 4" (1 626 m)   Wt 77 1 kg (169 lb 15 6 oz)   LMP 10/01/2021 (Within Weeks)   SpO2 (!) 85%   BMI 29 18 kg/m²   General appearance: alert and oriented, in no acute distress  Head: Normocephalic, without obvious abnormality, atraumatic  Lungs: clear to auscultation bilaterally  Heart: regular rate and rhythm, S1, S2 normal, no murmur, click, rub or gallop  Abdomen: soft, non-tender; bowel sounds normal; no masses,  no organomegaly and Moderate postoperative tenderness around incisions  Skin: Skin color, texture, turgor normal  No rashes or lesions or incisions are c/d/i    Lab, Imaging and other studies:  I have personally reviewed pertinent lab results    , CBC:   Lab Results   Component Value Date    WBC 9 59 02/25/2022    HGB 12 2 02/25/2022    HCT 38 9 02/25/2022    MCV 84 02/25/2022     02/25/2022    MCH 26 4 (L) 02/25/2022    MCHC 31 4 02/25/2022    RDW 13 2 02/25/2022    MPV 11 2 02/25/2022    NRBC 0 02/25/2022   , CMP:   Lab Results   Component Value Date    SODIUM 140 02/25/2022    K 3 5 02/25/2022     02/25/2022    CO2 26 02/25/2022    BUN 7 02/25/2022    CREATININE 0 61 02/25/2022    CALCIUM 9 2 02/25/2022    AST 11 02/25/2022    ALT 20 02/25/2022    ALKPHOS 85 02/25/2022    EGFR 104 02/25/2022     VTE Pharmacologic Prophylaxis: held until tomorrow  VTE Mechanical Prophylaxis: sequential compression device

## 2022-02-25 NOTE — ED NOTES
Patient immediately vomits following administration of carafate  EDMD Dr Jose Rodriguez aware of patient inability to tolerate PO intake will order further antiemetics        Lorri Carroll RN  02/25/22 9845

## 2022-02-25 NOTE — ED CARE HANDOFF
Emergency Department Sign Out Note        Sign out and transfer of care from previous provider  See Separate Emergency Department note  The patient, Amber Holliday, was evaluated by the previous provider for abdominal pain  Workup Completed:  Blood work    ED Course / Workup Pending (followup):  CT abdomen/pelvis                                  ED Course as of 02/25/22 0757   Fri Feb 25, 2022   0708 Needs delta troponin   0721 Case discussed with general surgeon on-call, Dr Naila Riddle, who reviewed radiology results  Patient has a distended gallbladder with a large stone  Patient will undergo cholecystectomy  Patient will be admitted to General surgery service  Patient agrees with admission plans  Procedures  MDM  Number of Diagnoses or Management Options  Acute cholecystitis: new and requires workup  Gallstones: new and requires workup     Amount and/or Complexity of Data Reviewed  Clinical lab tests: reviewed  Tests in the radiology section of CPT®: reviewed  Tests in the medicine section of CPT®: reviewed  Review and summarize past medical records: yes  Independent visualization of images, tracings, or specimens: yes    Risk of Complications, Morbidity, and/or Mortality  General comments: Patient presented with abdominal pain for few days  Patient had blood work done  CT PE study with abdomen/pelvis was pending  CT scan did not show any acute pulmonary embolism  There was concern for distended gallbladder with gallstones suggesting acute cholecystitis  Radiology study was reviewed by general surgeon on-call, Dr Naila Riddle, who recommended cholecystectomy  At this time patient will be admitted to general surgery service  Patient agrees with admission plans      Patient Progress  Patient progress: stable          Disposition  Final diagnoses:   Acute cholecystitis   Gallstones     Time reflects when diagnosis was documented in both MDM as applicable and the Disposition within this note Time User Action Codes Description Comment    2/25/2022  7:19 AM Ninfa Lob P Add [K81 0] Acute cholecystitis     2/25/2022  7:26 AM Ferdous, Suelma Blare Add [R10 9] Abdominal pain     2/25/2022  7:26 AM Ferdous, Sulema Blare Add [K80 20] Gallstones     2/25/2022  7:55 AM Ferdous, Sulema Blare Remove [R10 9] Abdominal pain     2/25/2022  7:55 AM FerdousHudsonmaira Remove [K80 20] Gallstones     2/25/2022  7:55 AM FerdousSulemare Modify [K81 0] Acute cholecystitis     2/25/2022  7:55 AM Deyanira Prirebekah Add [K80 20] Gallstones       ED Disposition     ED Disposition Condition Date/Time Comment    Admit Stable Fri Feb 25, 2022  7:26 AM Case was discussed with Dr Herbert Heck and the patient's admission status was agreed to be Admission Status: observation status to the service of Dr Herbert Heck  Follow-up Information    None       Patient's Medications   Discharge Prescriptions    No medications on file     No discharge procedures on file         ED Provider  Electronically Signed by     Elenora Saint, DO  02/25/22 5919

## 2022-02-25 NOTE — OP NOTE
OPERATIVE REPORT  PATIENT NAME: Caren Juarez    :  1970  MRN: [id number]  Pt Location: [facility] OR ROOM 01    SURGERY DATE: 2022    Surgeon(s) and Role:     * Kemi Mike MD - Primary     * Tiffany Vasquez PA-C - Assisting    Preop Diagnosis:  Acute cholecystitis [K81 0]    Post-Op Diagnosis Codes:     * Acute cholecystitis [K81 0]    Procedure(s) (LRB):  CHOLECYSTECTOMY LAPAROSCOPIC (N/A)    Specimen(s):  ID Type Source Tests Collected by Time Destination   1 : GALLBLADDER AND CONTENTS Tissue Gallbladder TISSUE EXAM Kemi Mike MD 2022 1145        Estimated Blood Loss:   Minimal    Drains:  * No LDAs found *    Anesthesia Type:   General    Operative Indications:  Acute cholecystitis [K81 0]        Operative Findings:  Empyema of the gallbladder    Complications:   None    Procedure and Technique:  Laparoscopic cholecystectomy  Patient was taken back to main operating room, placed supine on the operating table, general anesthesia was induced, and the abdomen was prepped and draped in normal fashion  Utilizing the Veress needle at the umbilicus, a pneumoperitoneum was created to 15 mmHg and maintained at this level throughout the case  An 11 mm trocar was placed at the umbilicus  Three additional 5 mm trocars were placed in the upper abdomen  Utilizing standard laparoscopic technique, a very inflamed distended gallbladder was clearly identified  In order to facilitate retraction, the gallbladder was decompressed utilizing the Veress needle  60 cc of purulent tan fluid was removed from the gallbladder  The gallbladder was then retracted  A large stone was impacted in the neck of the gallbladder and this was pushed into the body of the gallbladder to facilitate retraction  Dissection at the infundibulum revealed the cystic duct and cystic artery  After the critical view was obtained, these structures were clipped and divided    The gallbladder was removed from the gallbladder bed fossa placed in the Endo-Catch bag  During this process, there was no spillage from the gallbladder  The right upper quadrant was irrigated and suctioned  Due to the size of the inflamed gallbladder and stone, a mini ex lap was made at the site of the 11 mm umbilical trocar  The fascia was opened with Mcallister scissors and the specimen was retrieved  The fascia was closed with 3 interrupted 0 Ethibond sutures  The wound was copiously irrigated and soft tissue was approximated with 3-0 Vicryl  4-0 Monocryl was used to close incisions  Pt was extubated in the OR and sent to the PACU in stable condition       I was present for the entire procedure, A qualified resident physician was not available and A physician assistant was required during the procedure for retraction tissue handling,dissection and suturing    Patient Disposition:  PACU       SIGNATURE: Sree Peterson MD  DATE: February 25, 2022  TIME: 12:06 PM

## 2022-02-25 NOTE — ANESTHESIA PREPROCEDURE EVALUATION
Procedure:  CHOLECYSTECTOMY LAPAROSCOPIC (N/A Abdomen)    Relevant Problems   ENDO   (+) Type 2 diabetes mellitus without complication (HCC)        Physical Exam    Airway    Mallampati score: II  TM Distance: >3 FB  Neck ROM: full     Dental   No notable dental hx     Cardiovascular  Cardiovascular exam normal    Pulmonary  Pulmonary exam normal     Other Findings        Anesthesia Plan  ASA Score- 2     Anesthesia Type- general with ASA Monitors  Additional Monitors:   Airway Plan: ETT  Plan Factors-Exercise tolerance (METS): >4 METS  Chart reviewed  Imaging results reviewed  Existing labs reviewed  Patient summary reviewed  Patient is not a current smoker  Induction- intravenous  Postoperative Plan- Plan for postoperative opioid use  Informed Consent- Anesthetic plan and risks discussed with patient  I personally reviewed this patient with the CRNA  Discussed and agreed on the Anesthesia Plan with the CRNA  Bruno Ortega

## 2022-02-25 NOTE — ANESTHESIA POSTPROCEDURE EVALUATION
Post-Op Assessment Note    CV Status:  Stable  Pain Score: 0    Pain management: adequate     Mental Status:  Sleepy and arousable   Hydration Status:  Stable   PONV Controlled:  None   Airway Patency:  Patent      Post Op Vitals Reviewed: Yes      Staff: CRNA   Comments: spontaneously breathing, HOB @ 30 degrees, protecting airway, vss, fully endorsed to recovery w/o AC        No complications documented      BP      Temp      Pulse     Resp      SpO2   99

## 2022-02-25 NOTE — H&P
H&P Exam - General Surgery   Aliza Santana 45 y o  female MRN: [id number]  Unit/Bed#: ED 03 Encounter: [id number]    Assessment/Plan     Assessment:  · Acute cholecystitis: 2 days of mid epigastric/chest pain that radiates 2 right shoulder with associated nausea and vomiting  CT scan: Cholelithiasis, gallbladder distention, gallbladder wall edema  No leukocytosis  troponin negative  · DM II: HgbA1c: 7 2% per patient  Plan: To the OR for laparoscopic cholecystectomy  Consent obtained by Dr Vikram Hernandez  Patient received IV zosyn in ED  Case request placed  Care coordinated with Anesthesia and OR staff  Patient and  verbalizes understanding and agrees with treatment  History of Present Illness     HPI:  Aliza Santana is a 45 y o  female pmh of DM type II, presenting with 2 days of mid epigastric/chest pain that radiates to right shoulder with assoicated nausea and vomiting  Patient reports pain started after eating pineapple fried rice x 2 dyas Patient reports she was seen yesterday ED with the same symptoms and came back this morning because pain persisted  Patient reports 2 previous C-sections  Denies any previous abdominal surgeries in the past  Denies shortness of breath  In the emergency department CT scan showing: Cholelithiasis, gallbladder distention, gallbladder wall edema  Patient is afebrile no leukocytosis, no transaminitis, troponins were negative  Review of Systems   Constitutional: Negative for chills, fatigue and fever  HENT: Negative for congestion, ear pain, hearing loss, postnasal drip, sinus pressure, sinus pain and sore throat  Eyes: Negative for pain and discharge  Respiratory: Negative for chest tightness and shortness of breath  Cardiovascular: Positive for chest pain  Gastrointestinal: Positive for abdominal pain, nausea and vomiting  Negative for constipation  Genitourinary: Negative for difficulty urinating     Musculoskeletal: Negative for arthralgias and myalgias  Skin: Negative for rash  Neurological: Negative for dizziness and headaches  Psychiatric/Behavioral: Negative for behavioral problems  Historical Information   Past Medical History:   Diagnosis Date    Diabetes ([facility] )     Diabetes mellitus ([facility] )     type 2     Past Surgical History:   Procedure Laterality Date     SECTION       Social History   Social History     Substance and Sexual Activity   Alcohol Use Never     Social History     Substance and Sexual Activity   Drug Use Never     Social History     Tobacco Use   Smoking Status Never Smoker   Smokeless Tobacco Never Used     E-Cigarette/Vaping    E-Cigarette Use Never User      E-Cigarette/Vaping Substances     Family History: non-contributory    Meds/Allergies   all medications and allergies reviewed  No Known Allergies    Objective   First Vitals:   Blood Pressure: 152/81 (22 0504)  Pulse: 103 (22 0504)  Temperature: 98 1 °F (36 7 °C) (22 0504)  Temp Source: Oral (22 0504)  Respirations: 18 (22 0504)  Height: 5' 4" (162 6 cm) (22 0504)  Weight - Scale: 77 1 kg (169 lb 15 6 oz) (22 0504)  SpO2: 98 % (22 0504)    Current Vitals:   Blood Pressure: 158/84 (22 0708)  Pulse: 78 (22 0708)  Temperature: 98 1 °F (36 7 °C) (22 0504)  Temp Source: Oral (22 0504)  Respirations: 16 (22 0708)  Height: 5' 4" (162 6 cm) (22 0504)  Weight - Scale: 77 1 kg (169 lb 15 6 oz) (22 0504)  SpO2: 97 % (22 0708)    No intake or output data in the 24 hours ending 22 0803    Invasive Devices  Report    Peripheral Intravenous Line            Peripheral IV 22 Right Antecubital <1 day                Physical Exam  Vitals and nursing note reviewed  Constitutional:       Appearance: Normal appearance  HENT:      Head: Normocephalic and atraumatic        Nose: Nose normal       Mouth/Throat:      Mouth: Mucous membranes are moist    Eyes:      Extraocular Movements: Extraocular movements intact  Cardiovascular:      Rate and Rhythm: Normal rate and regular rhythm  Pulses: Normal pulses  Pulmonary:      Effort: Pulmonary effort is normal       Breath sounds: Normal breath sounds  Abdominal:      General: Bowel sounds are normal  There is no distension  Palpations: Abdomen is soft  Tenderness: There is abdominal tenderness in the right upper quadrant  Hernia: No hernia is present  Neurological:      Mental Status: She is alert  Lab Results:   I have personally reviewed pertinent lab results  , CBC:   Lab Results   Component Value Date    WBC 9 59 02/25/2022    HGB 12 2 02/25/2022    HCT 38 9 02/25/2022    MCV 84 02/25/2022     02/25/2022    MCH 26 4 (L) 02/25/2022    MCHC 31 4 02/25/2022    RDW 13 2 02/25/2022    MPV 11 2 02/25/2022    NRBC 0 02/25/2022   , CMP:   Lab Results   Component Value Date    SODIUM 140 02/25/2022    K 3 5 02/25/2022     02/25/2022    CO2 26 02/25/2022    BUN 7 02/25/2022    CREATININE 0 61 02/25/2022    CALCIUM 9 2 02/25/2022    AST 11 02/25/2022    ALT 20 02/25/2022    ALKPHOS 85 02/25/2022    EGFR 104 02/25/2022     Imaging: I have personally reviewed pertinent reports  and I have personally reviewed pertinent films in PACS     PE Study with CT abdomen & pelvis with contrast   Final Result by Neris Degroot MD (02/25 2149)      No evidence of pulmonary embolus  Cholelithiasis, gallbladder distention, and gallbladder wall edema likely representing acute cholecystitis  Workstation performed: [id number]             EKG, Pathology, and Other Studies: I have personally reviewed pertinent reports  Counseling / Coordination of Care  Total floor / unit time spent today 60 minutes  Greater than 50% of total time was spent with the patient and / or family counseling and / or coordination of care    A description of the counseling / coordination of care:obtaining history, performing physical exam, reviewing pertinent labs imaging, discussing case with attending  Elio Keenan

## 2022-02-25 NOTE — PLAN OF CARE
Problem: PAIN - ADULT  Goal: Verbalizes/displays adequate comfort level or baseline comfort level  Description: Interventions:  - Encourage patient to monitor pain and request assistance  - Assess pain using appropriate pain scale  - Administer analgesics based on type and severity of pain and evaluate response  - Implement non-pharmacological measures as appropriate and evaluate response  - Consider cultural and social influences on pain and pain management  - Notify physician/advanced practitioner if interventions unsuccessful or patient reports new pain  Outcome: Progressing     Problem: INFECTION - ADULT  Goal: Absence or prevention of progression during hospitalization  Description: INTERVENTIONS:  - Assess and monitor for signs and symptoms of infection  - Monitor lab/diagnostic results  - Monitor all insertion sites, i e  indwelling lines, tubes, and drains  - [place] appropriate cooling/warming therapies per order  - Administer medications as ordered  - Instruct and encourage patient and family to use good hand hygiene technique  - Identify and instruct in appropriate isolation precautions for identified infection/condition  Outcome: Progressing  Goal: Absence of fever/infection during neutropenic period  Description: INTERVENTIONS:  - Monitor WBC    Outcome: Progressing     Problem: SAFETY ADULT  Goal: Patient will remain free of falls  Description: INTERVENTIONS:  - Educate patient/family on patient safety including physical limitations  - Instruct patient to call for assistance with activity   - Consult OT/PT to assist with strengthening/mobility   - Keep Call bell within reach  - Keep bed low and locked with side rails adjusted as appropriate  - Keep care items and personal belongings within reach  - Initiate and maintain comfort rounds  - Make Fall Risk Sign visible to staff  - Offer Toileting every 2 Hours, in advance of need  - Initiate/Maintain 1alarm  - Obtain necessary fall risk management equipment: call bell use, bed alarm  - Apply yellow socks and bracelet for high fall risk patients  - Consider moving patient to room near nurses station  Outcome: Progressing  Goal: Maintain or return to baseline ADL function  Description: INTERVENTIONS:  -  Assess patient's ability to carry out ADLs; assess patient's baseline for ADL function and identify physical deficits which impact ability to perform ADLs (bathing, care of mouth/teeth, toileting, grooming, dressing, etc )  - Assess/evaluate cause of self-care deficits   - Assess range of motion  - Assess patient's mobility; develop plan if impaired  - Assess patient's need for assistive devices and provide as appropriate  - Encourage maximum independence but intervene and supervise when necessary  - Involve family in performance of ADLs  - Assess for home care needs following discharge   - Consider OT consult to assist with ADL evaluation and planning for discharge  - Provide patient education as appropriate  Outcome: Progressing  Goal: Maintains/Returns to pre admission functional level  Description: INTERVENTIONS:  - Perform BMAT or MOVE assessment daily    - Set and communicate daily mobility goal to care team and patient/family/caregiver  - Collaborate with rehabilitation services on mobility goals if consulted  - Perform Range of Motion 3 times a day  - Reposition patient every 3 hours    - Dangle patient 3 times a day  - Stand patient 3 times a day  - Ambulate patient 3 times a day  - Out of bed to chair 3 times a day   - Out of bed for meals 3 times a day  - Out of bed for toileting  - Record patient progress and toleration of activity level   Outcome: Progressing     Problem: DISCHARGE PLANNING  Goal: Discharge to home or other facility with appropriate resources  Description: INTERVENTIONS:  - Identify barriers to discharge w/patient and caregiver  - Arrange for needed discharge resources and transportation as appropriate  - Identify discharge learning needs (meds, wound care, etc )  - Arrange for interpretive services to assist at discharge as needed  - Refer to Case Management Department for coordinating discharge planning if the patient needs post-hospital services based on physician/advanced practitioner order or complex needs related to functional status, cognitive ability, or social support system  Outcome: Progressing     Problem: Knowledge Deficit  Goal: Patient/family/caregiver demonstrates understanding of disease process, treatment plan, medications, and discharge instructions  Description: Complete learning assessment and assess knowledge base    Interventions:  - Provide teaching at level of understanding  - Provide teaching via preferred learning methods  Outcome: Progressing

## 2022-02-25 NOTE — ED NOTES
OR tech here to transport pt   has pt's belongings  Dr Nilay Stone has consents, stickers and EKG       Sana Cruz RN  02/25/22 3314

## 2022-02-25 NOTE — ED PROVIDER NOTES
Final Diagnosis:  1  Acute cholecystitis    2  Gallstones        Chief Complaint   Patient presents with    Vomiting     Patient seen in ED  for chest pain presents w/ recurrent nausea and vomiting for past 24 hours  States 'I can't keep anything down ' Reports being unable to take RX medications, further endorses burning chest pain  deneis diarrhea or fever  HPI  HPI  54-year-old presents for 2nd visit for right chest pain  Yesterday she was seen had abdominal labs which were normal as well as a dimer which age adjusted normal   She had delta troponin which remained negative and no signs of ACS on her EKG  She had symptomatic relief most with a GI cocktail including viscous lidocaine and Maalox so presumed gastritis  Sound with Pepcid however started vomiting next day and unable to keep down her Pepcid so presents here with continued right upper quadrant/right lower chest pain  Mahnaz to differentiate these  I am not able to appreciate Virl Radha sign that she does report some mild discomfort when I am palpating right upper quadrant  She also notes epigastric tenderness      - No language barrier    - History obtained from patient  - There are no limitations to the history obtained  - Previous charting underwent limited review with attention to last ED visits, labs, ekgs, and prior imaging      PMH:   has a past medical history of Diabetes ([facility] [address] ) and Diabetes mellitus ([facility] [address] )      PSH:   has a past surgical history that includes  section       Social History:  noncontributory     ROS:     Pertinent positives/negatives:   Review of Systems   Cardiovascular: Positive for chest pain  Negative for palpitations and leg swelling  Gastrointestinal: Positive for abdominal pain, nausea and vomiting  Negative for abdominal distention, blood in stool, constipation and diarrhea          CONSTITUTIONAL:  No dizziness  No weakness  No unexpected weight loss  EYES:  No pain, erythema, or discharge   No loss of vision  ENT:  No tinnitus, decreased hearing  No epistaxis/purulent drainage  No voice change, airway closing, trismus  RESPIRATORY:  No purulent cough  No hemoptysis  No dyspnea  No paroxysmal nocturnal dyspnea  No stridor, audible wheezing bedside  GENITOURINARY:  No frequency, urgency, nocturia  No hematuria or dysuria  No discharge  No sores/adenopathy  MUSCULOSKELETAL:  No arthralgias or myalgias that are new  INTEGUMENTARY:  No swelling  No unexpected contusions  No abrasions  No lymphangitis  NEUROLOGIC:  No meningismus  No numbness of the extremities  No new focal weakness  No postural instability  PSYCHIATRIC:  No SI HI AVH  HEMATOLOGICAL:  No bleeding  No petechiae  No bruising  ALLERGIES:  No urticaria  No sudden abd cramping  No stridor      PE:      Physical exam highlights:   Physical Exam           Vitals reviewed by me  Nursing note reviewed  Chaperone present for all sensitive exam   Const: No acute distress  Alert  Nontoxic  Not diaphoretic  HEENT: External ears normal  No protrusion drainage swelling  Nose normal  No drainage/traumatic deformity  MMM  Mouth with baseline/symmetric movement  No trismus  Eyes: No squinting  No icterus  Tracks through the room with normal EOM  No tearing/swelling/drainage  Neck: ROM normal  No rigidity  No meningismus  Cards: Rate as per vitals  Compared to monitor sinus unless documented above  Regular  Well perfused  Pulm: able to verbalize without additional effort  Effort and excursion normal  No disress  No audible wheezing/ stridor  Normal resp rate  Abd: No distension beyond baseline  No fluctuant wave  Patient without peritoneal pain with shifting/bumping the bed  RUQ pain  No peritonitis  Neg levy  MSK: ROM normal and baseline  No deformity  Skin: No new rashes visible  Well perfused  Neuro: Nonfocal  Baseline  CN grossly intact  Moving all four with coordination     Psych: Normal behavior and affect            A:  - Nursing note reviewed      Ddx and MDM  PE scan  ACS unlikely  Ureterolithiasis  Cholecystitis - resend labs  Ct ab pelv       Vitals:    02/26/22 0329 02/26/22 0548 02/26/22 0548 02/26/22 0729   BP: 106/65 112/67 112/67 112/70   BP Location:       Pulse: 89 90 86 88   Resp: 18 16  16   Temp: 98 °F (36 7 °C) 98 6 °F (37 °C) 98 6 °F (37 °C) 98 3 °F (36 8 °C)   TempSrc:       SpO2: 90% 90% 93% 94%   Weight:       Height:                            ED Course as of 02/27/22 0210   Fri Feb 25, 2022   5681 Procedure Note: EKG  Date/Time: 02/25/22 6:31 AM   Interpreted by: Sera Bynum  Indications / Diagnosis: CP  ECG reviewed by me, the ED Provider: yes   The EKG demonstrates:  Rhythm: sinus  first degree block  Intervals:  long pr 216normal intervals  Axis: normal axis  QRS/Blocks: normal QRS  ST Changes: No acute ST Changes, no STD/LOGAN  T wave changes: none         PE Study with CT abdomen & pelvis with contrast   Final Result      No evidence of pulmonary embolus  Cholelithiasis, gallbladder distention, and gallbladder wall edema likely representing acute cholecystitis                 Workstation performed: [id number]           Orders Placed This Encounter   Procedures    COVID/FLU/RSV - 2 hour TAT    PE Study with CT abdomen & pelvis with contrast    CBC and differential    Comprehensive metabolic panel    Lipase    HS Troponin 0hr (reflex protocol)    Discharge Diet    Notify admitting physician    Notify admitting physician on arrival    Activity as tolerated    No strenuous exercise    Call provider for:  extreme fatigue    Call provider for:  persistent dizziness or light-headedness    Call provider for:  hives    Call provider for:  difficulty breathing, headache or visual disturbances    Call provider for: active or persistent bleeding    Call provider for:  redness, tenderness, or signs of infection (pain, swelling, redness, odor or green/yellow discharge around incision site)    Call provider for:  severe uncontrolled pain    Call provider for:  persistent nausea or vomiting    No dressing needed    EKG RESULTS    ECG 12 lead    ECG 12 lead    Place in Observation    Place in Observation    Transfer patient    Discharge patient    Outpatient No Charge Bed     Labs Reviewed   CBC AND DIFFERENTIAL - Abnormal       Result Value Ref Range Status    WBC 9 59  4 31 - 10 16 Thousand/uL Final    RBC 4 62  3 81 - 5 12 Million/uL Final    Hemoglobin 12 2  11 5 - 15 4 g/dL Final    Hematocrit 38 9  34 8 - 46 1 % Final    MCV 84  82 - 98 fL Final    MCH 26 4 (*) 26 8 - 34 3 pg Final    MCHC 31 4  31 4 - 37 4 g/dL Final    RDW 13 2  11 6 - 15 1 % Final    MPV 11 2  8 9 - 12 7 fL Final    Platelets 606  177 - 390 Thousands/uL Final    nRBC 0  /100 WBCs Final    Neutrophils Relative 80 (*) 43 - 75 % Final    Immat GRANS % 0  0 - 2 % Final    Lymphocytes Relative 15  14 - 44 % Final    Monocytes Relative 5  4 - 12 % Final    Eosinophils Relative 0  0 - 6 % Final    Basophils Relative 0  0 - 1 % Final    Neutrophils Absolute 7 63 (*) 1 85 - 7 62 Thousands/µL Final    Immature Grans Absolute 0 03  0 00 - 0 20 Thousand/uL Final    Lymphocytes Absolute 1 39  0 60 - 4 47 Thousands/µL Final    Monocytes Absolute 0 52  0 17 - 1 22 Thousand/µL Final    Eosinophils Absolute 0 00  0 00 - 0 61 Thousand/µL Final    Basophils Absolute 0 02  0 00 - 0 10 Thousands/µL Final   COMPREHENSIVE METABOLIC PANEL - Abnormal    Sodium 140  136 - 145 mmol/L Final    Potassium 3 5  3 5 - 5 3 mmol/L Final    Chloride 104  100 - 108 mmol/L Final    CO2 26  21 - 32 mmol/L Final    ANION GAP 10  4 - 13 mmol/L Final    BUN 7  5 - 25 mg/dL Final    Creatinine 0 61  0 60 - 1 30 mg/dL Final    Comment: Standardized to IDMS reference method    Glucose 155 (*) 65 - 140 mg/dL Final    Comment: If the patient is fasting, the ADA then defines impaired fasting glucose as > 100 mg/dL and diabetes as > or equal to 123 mg/dL    Specimen collection should occur prior to Sulfasalazine administration due to the potential for falsely depressed results  Specimen collection should occur prior to Sulfapyridine administration due to the potential for falsely elevated results  Calcium 9 2  8 3 - 10 1 mg/dL Final    AST 11  5 - 45 U/L Final    Comment: Specimen collection should occur prior to Sulfasalazine administration due to the potential for falsely depressed results  ALT 20  12 - 78 U/L Final    Comment: Specimen collection should occur prior to Sulfasalazine administration due to the potential for falsely depressed results  Alkaline Phosphatase 85  46 - 116 U/L Final    Total Protein 8 2  6 4 - 8 2 g/dL Final    Albumin 4 1  3 5 - 5 0 g/dL Final    Total Bilirubin 0 82  0 20 - 1 00 mg/dL Final    Comment: Use of this assay is not recommended for patients undergoing treatment with eltrombopag due to the potential for falsely elevated results  eGFR 104  ml/min/1 73sq m Final    Narrative:     [place] guidelines for Chronic Kidney Disease (CKD):     Stage 1 with normal or high GFR (GFR > 90 mL/min/1 73 square meters)    Stage 2 Mild CKD (GFR = 60-89 mL/min/1 73 square meters)    Stage 3A Moderate CKD (GFR = 45-59 mL/min/1 73 square meters)    Stage 3B Moderate CKD (GFR = 30-44 mL/min/1 73 square meters)    Stage 4 Severe CKD (GFR = 15-29 mL/min/1 73 square meters)    Stage 5 End Stage CKD (GFR <15 mL/min/1 73 square meters)  Note: GFR calculation is accurate only with a steady state creatinine   LIPASE - Normal    Lipase 166  73 - 393 u/L Final   HS TROPONIN I 0HR - Normal    hs TnI 0hr <2  "Refer to ACS Flowchart"- see link ng/L Final    Comment:                                              Initial (time 0) result  If >=50 ng/L, Myocardial injury suggested ;  Type of myocardial injury and treatment strategy  to be determined  If 5-49 ng/L, a delta result at 2 hours and or 4 hours will be needed to further evaluate    If <4 ng/L, and chest pain has been >3 hours since onset, patient may qualify for discharge based on the HEART score in the ED  If <5 ng/L and <3hours since onset of chest pain, a delta result at 2 hours will be needed to further evaluate  HS Troponin 99th Percentile URL of a Health Population=12 ng/L with a 95% Confidence Interval of 8-18 ng/L  Second Troponin (time 2 hours)  If calculated delta >= 20 ng/L,  Myocardial injury suggested ; Type of myocardial injury and treatment strategy to be determined  If 5-49 ng/L and the calculated delta is 5-19 ng/L, consult medical service for evaluation  Continue evaluation for ischemia on ecg and other possible etiology and repeat hs troponin at 4 hours  If delta is <5 ng/L at 2 hours, consider discharge based on risk stratification via the HEART score (if in ED), or NIA risk score in IP/Observation  HS Troponin 99th Percentile URL of a Health Population=12 ng/L with a 95% Confidence Interval of 8-18 ng/L  Final Diagnosis:  1  Acute cholecystitis    2   Gallstones        P:  - hospital tx includes   Medications   ondansetron (ZOFRAN) injection 4 mg (4 mg Intravenous Given 2/25/22 0523)   sodium chloride 0 9 % bolus 1,000 mL (0 mL Intravenous Stopped 2/25/22 0807)   morphine (PF) 4 mg/mL injection 4 mg (4 mg Intravenous Given 2/25/22 0709)   Lidocaine Viscous HCl (XYLOCAINE) 2 % mucosal solution 15 mL (0 mL Swish & Spit Hold 2/25/22 0612)   sucralfate (CARAFATE) tablet 1 g (1 g Oral Given 2/25/22 0557)   metoclopramide (REGLAN) injection 10 mg (10 mg Intravenous Given 2/25/22 0602)   OLANZapine (ZyPREXA) IM injection 5 mg (5 mg Intramuscular Given 2/25/22 0627)   iohexol (OMNIPAQUE) 350 MG/ML injection (SINGLE-DOSE) 100 mL (100 mL Intravenous Given 2/25/22 0696)   piperacillin-tazobactam (ZOSYN) 3 375 g in sodium chloride 0 9 % 100 mL IVPB (3 375 g Intravenous New Bag 2/25/22 7647)         - disposition  Time reflects when diagnosis was documented in both MDM as applicable and the Disposition within this note     Time User Action Codes Description Comment    2/25/2022  7:19 AM Patriciajessica Tim P Add [K81 0] Acute cholecystitis     2/25/2022  7:26 AM Ferdous, Valentine [place] Add [R10 9] Abdominal pain     2/25/2022  7:26 AM Ferdous, Valentine Kevin Add [K80 20] Gallstones     2/25/2022  7:55 AM Ferdous, Valentine [place] Remove [R10 9] Abdominal pain     2/25/2022  7:55 AM Ferdous, Valentine [place] Remove [K80 20] Gallstones     2/25/2022  7:55 AM Ferdous, Valentine [place] Modify [K81 0] Acute cholecystitis     2/25/2022  7:55 AM Ferdous, Valentine [place] Add [K80 20] Gallstones     2/25/2022 11:46 AM Tarik Duron Modify [K81 0] Acute cholecystitis     2/26/2022  8:29 AM Marilee Gaston Modify [K81 0] Acute cholecystitis       ED Disposition     ED Disposition Condition Date/Time Comment    Admit Stable Fri Feb 25, 2022  7:26 AM Case was discussed with Dr Rogelio Dean and the patient's admission status was agreed to be Admission Status: observation status to the service of Dr Rogelio Dean  Follow-up Information     Follow up With Specialties Details Why [address] [place] MD Lidia General Surgery, Wound Care Follow up in 2 week(s)  [facility], [address] [place] [address]      Monse Mari MD Internal Medicine Follow up in 2 week(s)  [address] [place] [address]  [phone]            - patient will call their PCP to let them know they were in the emergency department   We discuss return precautions       - additional tx intended, if consistent with primary provider:  - patient to follow with :      Discharge Medication List as of 2/26/2022  8:53 AM      START taking these medications    Details   oxyCODONE-acetaminophen (PERCOCET) 5-325 mg per tablet Take 1 tablet by mouth every 6 (six) hours as needed for moderate pain for up to 8 doses Max Daily Amount: 4 tablets, Starting Sat 2/26/2022, Normal         CONTINUE these medications which have NOT CHANGED    Details cholecalciferol (VITAMIN D3) 1,000 units tablet Take 1,000 Units by mouth daily, Historical Med      famotidine (PEPCID) 20 mg tablet Take 1 tablet (20 mg total) by mouth 2 (two) times a day, Starting Thu 2/24/2022, Normal      ibuprofen (MOTRIN) 600 mg tablet Take 600 mg by mouth daily, Historical Med      IRON, FERROUS SULFATE, PO Take by mouth, Historical Med      metFORMIN (GLUCOPHAGE-XR) 500 mg 24 hr tablet TAKE 3 TABS (1,500 MG) BY MOUTH DAILY (WITH BREAKFAST), Historical Med      multivitamin (THERAGRAN) TABS Take 1 tablet by mouth daily, Historical Med      rosuvastatin (CRESTOR) 20 MG tablet Take 1 tablet (20 mg total) by mouth daily at bedtime, Starting Tue 2/23/2021, Normal      Semaglutide (Rybelsus) 7 MG TABS Take 7 mg by mouth in the morning, Historical Med           Outpatient Discharge Orders   Discharge Diet     Activity as tolerated     No strenuous exercise     Call provider for:  extreme fatigue     Call provider for:  persistent dizziness or light-headedness     Call provider for:  hives     Call provider for:  difficulty breathing, headache or visual disturbances     Call provider for: active or persistent bleeding     Call provider for:  redness, tenderness, or signs of infection (pain, swelling, redness, odor or green/yellow discharge around incision site)     Call provider for:  severe uncontrolled pain     Call provider for:  persistent nausea or vomiting     No dressing needed     Prior to Admission Medications   Prescriptions Last Dose Informant Patient Reported? Taking?    IRON, FERROUS SULFATE, PO 2/24/2022 at Unknown time Self Yes Yes   Sig: Take by mouth   Semaglutide (Rybelsus) 7 MG TABS 2/24/2022 at Unknown time  Yes Yes   Sig: Take 7 mg by mouth in the morning   cholecalciferol (VITAMIN D3) 1,000 units tablet 2/24/2022 at Unknown time Self Yes Yes   Sig: Take 1,000 Units by mouth daily   famotidine (PEPCID) 20 mg tablet 2/24/2022 at Unknown time  No Yes   Sig: Take 1 tablet (20 mg total) by mouth 2 (two) times a day   ibuprofen (MOTRIN) 600 mg tablet 2/24/2022 at Unknown time Self Yes Yes   Sig: Take 600 mg by mouth daily   metFORMIN (GLUCOPHAGE-XR) 500 mg 24 hr tablet 2/24/2022 at Unknown time Self Yes Yes   Sig: TAKE 3 TABS (1,500 MG) BY MOUTH DAILY (WITH BREAKFAST)   multivitamin (THERAGRAN) TABS 2/24/2022 at Unknown time Self Yes Yes   Sig: Take 1 tablet by mouth daily   rosuvastatin (CRESTOR) 20 MG tablet 2/24/2022 at Unknown time  No Yes   Sig: Take 1 tablet (20 mg total) by mouth daily at bedtime      Facility-Administered Medications: None       Portions of the record may have been created with voice recognition software  Occasional wrong word or "sound a like" substitutions may have occurred due to the inherent limitations of voice recognition software  Read the chart carefully and recognize, using context, where substitutions have occurred      Electronically signed by:  MD Luis Carlos Morrison MD  02/27/22 0959

## 2022-02-26 ENCOUNTER — NURSE TRIAGE (OUTPATIENT)
Dept: OTHER | Facility: OTHER | Age: 52
End: 2022-02-26

## 2022-02-26 VITALS
HEIGHT: 64 IN | WEIGHT: 169.97 LBS | TEMPERATURE: 98.3 F | OXYGEN SATURATION: 94 % | BODY MASS INDEX: 29.02 KG/M2 | SYSTOLIC BLOOD PRESSURE: 112 MMHG | DIASTOLIC BLOOD PRESSURE: 70 MMHG | HEART RATE: 88 BPM | RESPIRATION RATE: 16 BRPM

## 2022-02-26 LAB
ATRIAL RATE: 83 BPM
ATRIAL RATE: 93 BPM
GLUCOSE SERPL-MCNC: 137 MG/DL (ref 65–140)
GLUCOSE SERPL-MCNC: 88 MG/DL (ref 65–140)
P AXIS: 54 DEGREES
P AXIS: 62 DEGREES
PR INTERVAL: 206 MS
PR INTERVAL: 216 MS
QRS AXIS: 14 DEGREES
QRS AXIS: 21 DEGREES
QRSD INTERVAL: 82 MS
QRSD INTERVAL: 84 MS
QT INTERVAL: 360 MS
QT INTERVAL: 380 MS
QTC INTERVAL: 423 MS
QTC INTERVAL: 472 MS
T WAVE AXIS: 21 DEGREES
T WAVE AXIS: 51 DEGREES
VENTRICULAR RATE: 83 BPM
VENTRICULAR RATE: 93 BPM

## 2022-02-26 PROCEDURE — 93010 ELECTROCARDIOGRAM REPORT: CPT | Performed by: INTERNAL MEDICINE

## 2022-02-26 PROCEDURE — NC001 PR NO CHARGE: Performed by: SURGERY

## 2022-02-26 PROCEDURE — 99024 POSTOP FOLLOW-UP VISIT: CPT | Performed by: SURGERY

## 2022-02-26 PROCEDURE — 82948 REAGENT STRIP/BLOOD GLUCOSE: CPT

## 2022-02-26 RX ORDER — OXYCODONE HYDROCHLORIDE AND ACETAMINOPHEN 5; 325 MG/1; MG/1
1 TABLET ORAL EVERY 6 HOURS PRN
Qty: 24 TABLET | Refills: 0 | Status: SHIPPED | OUTPATIENT
Start: 2022-02-26

## 2022-02-26 RX ORDER — AMOXICILLIN AND CLAVULANATE POTASSIUM 875; 125 MG/1; MG/1
1 TABLET, FILM COATED ORAL EVERY 12 HOURS SCHEDULED
Qty: 6 TABLET | Refills: 0 | Status: SHIPPED | OUTPATIENT
Start: 2022-02-26 | End: 2022-03-01

## 2022-02-26 RX ADMIN — SODIUM CHLORIDE, SODIUM LACTATE, POTASSIUM CHLORIDE, AND CALCIUM CHLORIDE 75 ML/HR: .6; .31; .03; .02 INJECTION, SOLUTION INTRAVENOUS at 06:17

## 2022-02-26 RX ADMIN — KETOROLAC TROMETHAMINE 15 MG: 30 INJECTION, SOLUTION INTRAMUSCULAR at 06:17

## 2022-02-26 RX ADMIN — FAMOTIDINE 20 MG: 20 TABLET, FILM COATED ORAL at 08:48

## 2022-02-26 RX ADMIN — FERROUS SULFATE TAB 325 MG (65 MG ELEMENTAL FE) 325 MG: 325 (65 FE) TAB at 08:49

## 2022-02-26 RX ADMIN — KETOROLAC TROMETHAMINE 15 MG: 30 INJECTION, SOLUTION INTRAMUSCULAR at 00:00

## 2022-02-26 RX ADMIN — Medication 1000 UNITS: at 08:49

## 2022-02-26 NOTE — PLAN OF CARE
Problem: PAIN - ADULT  Goal: Verbalizes/displays adequate comfort level or baseline comfort level  Description: Interventions:  - Encourage patient to monitor pain and request assistance  - Assess pain using appropriate pain scale  - Administer analgesics based on type and severity of pain and evaluate response  - Implement non-pharmacological measures as appropriate and evaluate response  - Consider cultural and social influences on pain and pain management  - Notify physician/advanced practitioner if interventions unsuccessful or patient reports new pain  Outcome: Progressing     Problem: INFECTION - ADULT  Goal: Absence or prevention of progression during hospitalization  Description: INTERVENTIONS:  - Assess and monitor for signs and symptoms of infection  - Monitor lab/diagnostic results  - Monitor all insertion sites, i e  indwelling lines, tubes, and drains  - [place] appropriate cooling/warming therapies per order  - Administer medications as ordered  - Instruct and encourage patient and family to use good hand hygiene technique  - Identify and instruct in appropriate isolation precautions for identified infection/condition  Outcome: Progressing    Problem: SKIN/TISSUE INTEGRITY - ADULT  Goal: Incision(s), wounds(s) or drain site(s) healing without S/S of infection  Description: INTERVENTIONS  - Assess and document dressing, incision, wound bed, drain sites and surrounding tissue  - Provide patient and family education  Outcome: Progressing

## 2022-02-26 NOTE — TELEPHONE ENCOUNTER
Regarding: Post Cholecystectomy Antibiotic  ----- Message from Iza Liriano sent at 2/26/2022 10:15 AM EST -----  " My wife had a Cholecystectomy Lap yesterday, She was Discharged today  They told us she would be on an Antibiotic, but there is no Prescription for an Antibiotic   She needs to start it if needed "

## 2022-02-26 NOTE — NURSING NOTE
Patient's IV removed  Patient left with all belongings  AVS reviewed with patient  Patient verbalized understanding

## 2022-02-26 NOTE — NURSING NOTE
Pt left AMA, signed form after speaking with NP  IV removed  Belongings taken with pt  Pt ambulated out with security

## 2022-02-26 NOTE — PLAN OF CARE
Problem: PAIN - ADULT  Goal: Verbalizes/displays adequate comfort level or baseline comfort level  Description: Interventions:  - Encourage patient to monitor pain and request assistance  - Assess pain using appropriate pain scale  - Administer analgesics based on type and severity of pain and evaluate response  - Implement non-pharmacological measures as appropriate and evaluate response  - Consider cultural and social influences on pain and pain management  - Notify physician/advanced practitioner if interventions unsuccessful or patient reports new pain  Outcome: Progressing     Problem: INFECTION - ADULT  Goal: Absence or prevention of progression during hospitalization  Description: INTERVENTIONS:  - Assess and monitor for signs and symptoms of infection  - Monitor lab/diagnostic results  - Monitor all insertion sites, i e  indwelling lines, tubes, and drains  - [place] appropriate cooling/warming therapies per order  - Administer medications as ordered  - Instruct and encourage patient and family to use good hand hygiene technique  - Identify and instruct in appropriate isolation precautions for identified infection/condition  Outcome: Progressing     Problem: SAFETY ADULT  Goal: Patient will remain free of falls  Description: INTERVENTIONS:  - Educate patient/family on patient safety including physical limitations  - Instruct patient to call for assistance with activity   - Consult OT/PT to assist with strengthening/mobility   - Keep Call bell within reach  - Keep bed low and locked with side rails adjusted as appropriate  - Keep care items and personal belongings within reach  - Initiate and maintain comfort rounds  - Make Fall Risk Sign visible to staff  - Offer Toileting every 2 Hours, in advance of need    Problem: Knowledge Deficit  Goal: Patient/family/caregiver demonstrates understanding of disease process, treatment plan, medications, and discharge instructions  Description: Complete learning assessment and assess knowledge base    Interventions:  - Provide teaching at level of understanding  - Provide teaching via preferred learning methods  Outcome: Progressing     Problem: DISCHARGE PLANNING  Goal: Discharge to home or other facility with appropriate resources  Description: INTERVENTIONS:  - Identify barriers to discharge w/patient and caregiver  - Arrange for needed discharge resources and transportation as appropriate  - Identify discharge learning needs (meds, wound care, etc )  - Arrange for interpretive services to assist at discharge as needed  - Refer to Case Management Department for coordinating discharge planning if the patient needs post-hospital services based on physician/advanced practitioner order or complex needs related to functional status, cognitive ability, or social support system  Outcome: Progressing   - Apply yellow socks and bracelet for high fall risk patients  - Consider moving patient to room near nurses station  Outcome: Progressing

## 2022-02-26 NOTE — DISCHARGE SUMMARY
Discharge Summary - Kaity Long 45 y o  female MRN: [id number]    Unit/Bed#: 2 [place] [address] Encounter: [id number]    Admission Date:   Admission Orders (From admission, onward)     Ordered        02/25/22 0726  Place in Observation  Once            02/25/22 0728  Place in Observation  Once                        Admitting Diagnosis: Acute cholecystitis [K81 0]  Vomiting [R11 10]  Gallstones [K80 20]    Nghia Amaral 2/25/22  HPI: Kaity Long is a 45 y o  female pmh of DM type II, presenting with 2 days of mid epigastric/chest pain that radiates to right shoulder with assoicated nausea and vomiting  Patient reports pain started after eating pineapple fried rice x 2 dyas Patient reports she was seen yesterday ED with the same symptoms and came back this morning because pain persisted  Patient reports 2 previous C-sections  Denies any previous abdominal surgeries in the past  Denies shortness of breath  In the emergency department CT scan showing: Cholelithiasis, gallbladder distention, gallbladder wall edema  Patient is afebrile no leukocytosis, no transaminitis, troponins were negative  Procedures Performed:  Laparoscopic cholecystectomy    Summary of Hospital Course:  Patient came in with 2 days of nausea and vomiting and upper right quadrant abdominal tenderness  Patient had positive imaging for acute cholecystitis  Patient had immediate plans for laparoscopic cholecystectomy 2/25/22  Patient had laparoscopic cholecystectomy performed without any acute or immediate complications  Patient postoperative day 0 was still having some increased tenderness and difficulty with ambulating due to fatigue and tenderness  Patient had diet advanced and was followed into postoperative day 1  Postoperative day 1 patient was up and mobilizing significantly more  Patient was tolerating regular diet  Patient was doing much better with reduced tenderness    Patient was planned for discharge 2/26/22  Patient was given instructions in short course script for pain medication  Patient was instructed to follow-up in 2 weeks    Significant Findings, Care, Treatment and Services Provided:  Acute cholecystitis    Complications:  None    Discharge Diagnosis:  Status post laparoscopic cholecystectomy    Medical Problems             Resolved Problems  Date Reviewed: 11/15/2019    None                Condition at Discharge: good         Discharge instructions/Information to patient and family:   See after visit summary for information provided to patient and family  Provisions for Follow-Up Care:  See after visit summary for information related to follow-up care and any pertinent home health orders  PCP: Mallika Gregg MD    Disposition: Home    Planned Readmission: No      Discharge Statement   I spent 10 minutes discharging the patient  This time was spent on the day of discharge  I had direct contact with the patient on the day of discharge  Additional documentation is required if more than 30 minutes were spent on discharge  Discharge Medications:  See after visit summary for reconciled discharge medications provided to patient and family

## 2022-02-26 NOTE — TELEPHONE ENCOUNTER
Patient had a lap shilo done yesterday  She was told she would be sent home with abx as she had a lot of pus  On call reached via TC  Provider will send for abx  Reason for Disposition   [1] Caller has URGENT medicine question about med that PCP or specialist prescribed AND [2] triager unable to answer question    Answer Assessment - Initial Assessment Questions  1  NAME of MEDICATION: "What medicine are you calling about?"      Abx     2  QUESTION: "What is your question?" (e g , medication refill, side effect)      I was never sent home with any antibiotics after my surgery yesterday  3  PRESCRIBING HCP: "Who prescribed it?" Reason: if prescribed by specialist, call should be referred to that group  Gen S[facility]     4  SYMPTOMS: "Do you have any symptoms?"      No     5   SEVERITY: If symptoms are present, ask "Are they mild, moderate or severe?"      N/A    Protocols used: MEDICATION QUESTION CALL-ADULT-

## 2022-02-26 NOTE — PROGRESS NOTES
Progress Note - General Surgery   Marli Buitrago 45 y o  female MRN: [id number]  Unit/Bed#: 2 [place] [address] Encounter: [id number]    Assessment:  1) POD #1 s/p lap shilo -  AVSS, pain reduced, tolerating regular diet, mobilizing more, using incentive spirometry well, voiding appropriately, significantly improved  2) DM type 2 non insulin dependent - stable, fingerstick glucose well controlled    Plan:  1-2)   - discharge today 2/26/22  - continue regular diet  - continue home medications  - patient Education  - follow-up in 2 weeks  - discharge instructions  - discontinue IV fluids    Subjective/Objective   Chief Complaint:  I am doing much better    Subjective:  Patient was seen examined at bedside  Patient denies any acute events overnight  Patient denies any fevers or chills  Patient denies any nausea or vomiting  Patient's abdominal pain has been reducing  Patient feels much better rested and has been mobilizing more getting up and out of bed to the bathroom  Patient has voided appropriately urinating well  Patient feels overall significantly improved compared to how she felt yesterday and the days prior  Patient is eager to go home  Patient has been tolerating regular diet without any issues  Objective:     Blood pressure 112/70, pulse 88, temperature 98 3 °F (36 8 °C), resp  rate 16, height 5' 4" (1 626 m), weight 77 1 kg (169 lb 15 6 oz), last menstrual period 10/01/2021, SpO2 94 %  ,Body mass index is 29 18 kg/m²        Intake/Output Summary (Last 24 hours) at 2/26/2022 3811  Last data filed at 2/26/2022 0617  Gross per 24 hour   Intake 1730 ml   Output 850 ml   Net 880 ml       Invasive Devices  Report    Peripheral Intravenous Line            Peripheral IV 02/26/22 Left;Ventral (anterior) Forearm <1 day                Physical Exam: /70   Pulse 88   Temp 98 3 °F (36 8 °C)   Resp 16   Ht 5' 4" (1 626 m)   Wt 77 1 kg (169 lb 15 6 oz)   LMP 10/01/2021 (Within Weeks)   SpO2 94%   BMI 29 18 kg/m²   General appearance: alert and oriented, in no acute distress  Head: Normocephalic, without obvious abnormality, atraumatic  Lungs: clear to auscultation bilaterally  Heart: regular rate and rhythm, S1, S2 normal, no murmur, click, rub or gallop  Abdomen: soft, non-tender; bowel sounds normal; no masses,  no organomegaly and minimal post operative tenderness    Lab, Imaging and other studies:I have personally reviewed pertinent lab results    ,   VTE Pharmacologic Prophylaxis: to be discharged today  VTE Mechanical Prophylaxis: sequential compression device

## 2022-02-26 NOTE — ED PROVIDER NOTES
Final Diagnosis:  1  Atypical chest pain    2  Acid reflux        Chief Complaint   Patient presents with    Chest Pain     Patient c/o mid/right chest pain that started last night after eating take-out for the first time in years  States the pain radiates straight through to her back  Also c/o nausea and "a lot" of vomiting yesterday      HPI  50-year-old presents for 2nd visit for right chest pain  Yesterday she was seen had abdominal labs which were normal as well as a dimer which age adjusted normal   She had delta troponin which remained negative and no signs of ACS on her EKG  She had symptomatic relief most with a GI cocktail including viscous lidocaine and Maalox so presumed gastritis  Sound with Pepcid however started vomiting next day and unable to keep down her Pepcid so presents here with continued right upper quadrant/right lower chest pain  Mahnaz to differentiate these  I am not able to appreciate Mya Code sign that she does report some mild discomfort when I am palpating right upper quadrant  She also notes epigastric tenderness     - No language barrier    - History obtained from patient  - There are no limitations to the history obtained  - Previous charting underwent limited review with attention to last ED visits, labs, ekgs, and prior imaging  PMH:   has a past medical history of Diabetes ([facility] [address] ) and Diabetes mellitus ([facility] [address] )  PSH:   has a past surgical history that includes  section  Social History:  noncontributory    ROS:    Pertinent positives/negatives:   Review of Systems   Cardiovascular: Positive for chest pain  Negative for palpitations and leg swelling  Gastrointestinal: Positive for abdominal pain, nausea and vomiting  Negative for abdominal distention, blood in stool, constipation and diarrhea  CONSTITUTIONAL:  No dizziness  No weakness  No unexpected weight loss  EYES:  No pain, erythema, or discharge  No loss of vision    ENT:  No tinnitus, decreased hearing  No epistaxis/purulent drainage  No voice change, airway closing, trismus  RESPIRATORY:  No purulent cough  No hemoptysis  No dyspnea  No paroxysmal nocturnal dyspnea  No stridor, audible wheezing bedside  GENITOURINARY:  No frequency, urgency, nocturia  No hematuria or dysuria  No discharge  No sores/adenopathy  MUSCULOSKELETAL:  No arthralgias or myalgias that are new  INTEGUMENTARY:  No swelling  No unexpected contusions  No abrasions  No lymphangitis  NEUROLOGIC:  No meningismus  No numbness of the extremities  No new focal weakness  No postural instability  PSYCHIATRIC:  No SI HI AVH  HEMATOLOGICAL:  No bleeding  No petechiae  No bruising  ALLERGIES:  No urticaria  No sudden abd cramping  No stridor  PE:     Physical exam highlights:   Physical Exam       Vitals:    02/24/22 0515 02/24/22 0530 02/24/22 0600 02/24/22 0630   BP: 132/85 107/57 116/65 109/64   BP Location: Right arm Right arm Right arm Right arm   Pulse: 75 89 78 76   Resp: 20 17 18 20   Temp:       TempSrc:       SpO2: 95% 96% 97% 96%   Weight:       Height:         Vitals reviewed by me  Nursing note reviewed  Chaperone present for all sensitive exam   Const: No acute distress  Alert  Nontoxic  Not diaphoretic  HEENT: External ears normal  No protrusion drainage swelling  Nose normal  No drainage/traumatic deformity  MMM  Mouth with baseline/symmetric movement  No trismus  Eyes: No squinting  No icterus  Tracks through the room with normal EOM  No tearing/swelling/drainage  Neck: ROM normal  No rigidity  No meningismus  Cards: Rate as per vitals  Compared to monitor sinus unless documented above  Regular  Well perfused  Pulm: able to verbalize without additional effort  Effort and excursion normal  No disress  No audible wheezing/ stridor  Normal resp rate  Abd: No distension beyond baseline  No fluctuant wave  Patient without peritoneal pain with shifting/bumping the bed  RUQ pain  No peritonitis  Neg levy  MSK: ROM normal and baseline  No deformity  Skin: No new rashes visible  Well perfused  Neuro: Nonfocal  Baseline  CN grossly intact  Moving all four with coordination  Psych: Normal behavior and affect  A:  - Nursing note reviewed      Ddx and MDM  PE scan  ACS unlikely  Ureterolithiasis  Cholecystitis - resend labs  Ct ab pelv    HEART Risk Score      Most Recent Value   Heart Score Risk Calculator    History 0 Filed at: 02/24/2022 5995   ECG 0 Filed at: 02/24/2022 1425   Age 1 Filed at: 02/24/2022 9274   Risk Factors 1 Filed at: 02/24/2022 9653   Troponin 0 Filed at: 02/24/2022 4080   HEART Score 2 Filed at: 02/24/2022 0690         Wells' Criteria for PE      Most Recent Value   Wells' Criteria for PE    Clinical signs and symptoms of DVT 0 Filed at: 02/24/2022 9548   PE is primary diagnosis or equally likely 0 Filed at: 02/24/2022 0452   HR >100 0 Filed at: 02/24/2022 0452   Immobilization at least 3 days or Surgery in the previous 4 weeks 0 Filed at: 02/24/2022 0452   Previous, objectively diagnosed PE or DVT 0 Filed at: 02/24/2022 0452   Hemoptysis 0 Filed at: 02/24/2022 1060   Malignancy with treatment within 6 months or palliative 0 Filed at: 02/24/2022 4802   Wells' Criteria Total 0 Filed at: 02/24/2022 0452         PERC Rule for PE      Most Recent Value   PERC Rule for PE    Age >=50 1 Filed at: 02/24/2022 0453   HR >=100 0 Filed at: 02/24/2022 0453   O2 Sat on room air < 95% 0 Filed at: 02/24/2022 0453   History of PE or DVT 0 Filed at: 02/24/2022 0453   Recent trauma or surgery 0 Filed at: 02/24/2022 0453   Hemoptysis 0 Filed at: 02/24/2022 0453   Exogenous estrogen 0 Filed at: 02/24/2022 0453   Unilateral leg swelling 0 Filed at: 02/24/2022 0453   PERC Rule for PE Results 1 Filed at: 02/24/2022 0453               ED Course as of 02/26/22 0513   Thu Feb 24, 2022   0446 Procedure Note: EKG  Date/Time: 02/24/22 4:46 AM   Interpreted by: Mel Alejo  Indications / Diagnosis: CP  ECG reviewed by me, the ED Provider: yes   The EKG demonstrates:  Rhythm: sinus    Intervals: logn   Axis: normal axis  QRS/Blocks: normal QRS  ST Changes: No acute ST Changes, no STD/LOGAN  T wave changes: none         XR chest 1 view portable   Final Result      No acute cardiopulmonary disease                    Workstation performed: [id number]           Orders Placed This Encounter   Procedures    XR chest 1 view portable    CBC and differential    HS Troponin 0hr (reflex protocol)    Comprehensive metabolic panel    Bilirubin, direct    D-dimer, quantitative    hCG, qualitative pregnancy     Labs Reviewed   CBC AND DIFFERENTIAL - Abnormal       Result Value Ref Range Status    WBC 9 28  4 31 - 10 16 Thousand/uL Final    RBC 4 65  3 81 - 5 12 Million/uL Final    Hemoglobin 12 3  11 5 - 15 4 g/dL Final    Hematocrit 39 0  34 8 - 46 1 % Final    MCV 84  82 - 98 fL Final    MCH 26 5 (*) 26 8 - 34 3 pg Final    MCHC 31 5  31 4 - 37 4 g/dL Final    RDW 12 9  11 6 - 15 1 % Final    MPV 11 0  8 9 - 12 7 fL Final    Platelets 167  267 - 390 Thousands/uL Final    nRBC 0  /100 WBCs Final    Neutrophils Relative 80 (*) 43 - 75 % Final    Immat GRANS % 0  0 - 2 % Final    Lymphocytes Relative 15  14 - 44 % Final    Monocytes Relative 4  4 - 12 % Final    Eosinophils Relative 1  0 - 6 % Final    Basophils Relative 0  0 - 1 % Final    Neutrophils Absolute 7 44  1 85 - 7 62 Thousands/µL Final    Immature Grans Absolute 0 03  0 00 - 0 20 Thousand/uL Final    Lymphocytes Absolute 1 36  0 60 - 4 47 Thousands/µL Final    Monocytes Absolute 0 36  0 17 - 1 22 Thousand/µL Final    Eosinophils Absolute 0 05  0 00 - 0 61 Thousand/µL Final    Basophils Absolute 0 04  0 00 - 0 10 Thousands/µL Final   COMPREHENSIVE METABOLIC PANEL - Abnormal    Sodium 141  136 - 145 mmol/L Final    Potassium 3 8  3 5 - 5 3 mmol/L Final    Chloride 103  100 - 108 mmol/L Final    CO2 26  21 - 32 mmol/L Final    ANION GAP 12  4 - 13 mmol/L Final BUN 7  5 - 25 mg/dL Final    Creatinine 0 48 (*) 0 60 - 1 30 mg/dL Final    Comment: Standardized to IDMS reference method    Glucose 153 (*) 65 - 140 mg/dL Final    Comment: If the patient is fasting, the ADA then defines impaired fasting glucose as > 100 mg/dL and diabetes as > or equal to 123 mg/dL  Specimen collection should occur prior to Sulfasalazine administration due to the potential for falsely depressed results  Specimen collection should occur prior to Sulfapyridine administration due to the potential for falsely elevated results  Calcium 9 1  8 3 - 10 1 mg/dL Final    AST 12  5 - 45 U/L Final    Comment: Specimen collection should occur prior to Sulfasalazine administration due to the potential for falsely depressed results  ALT 24  12 - 78 U/L Final    Comment: Specimen collection should occur prior to Sulfasalazine administration due to the potential for falsely depressed results  Alkaline Phosphatase 78  46 - 116 U/L Final    Total Protein 7 9  6 4 - 8 2 g/dL Final    Albumin 4 0  3 5 - 5 0 g/dL Final    Total Bilirubin 0 68  0 20 - 1 00 mg/dL Final    Comment: Use of this assay is not recommended for patients undergoing treatment with eltrombopag due to the potential for falsely elevated results      eGFR 113  ml/min/1 73sq m Final    Narrative:     [place] guidelines for Chronic Kidney Disease (CKD):     Stage 1 with normal or high GFR (GFR > 90 mL/min/1 73 square meters)    Stage 2 Mild CKD (GFR = 60-89 mL/min/1 73 square meters)    Stage 3A Moderate CKD (GFR = 45-59 mL/min/1 73 square meters)    Stage 3B Moderate CKD (GFR = 30-44 mL/min/1 73 square meters)    Stage 4 Severe CKD (GFR = 15-29 mL/min/1 73 square meters)    Stage 5 End Stage CKD (GFR <15 mL/min/1 73 square meters)  Note: GFR calculation is accurate only with a steady state creatinine   D-DIMER, QUANTITATIVE - Abnormal    D-Dimer, Quant 0 52 (*) <0 50 ug/ml FEU Final    Comment: Reference and upper limits to exclude DVT and PE are the same  Do not use to exclude if clinical symptoms are present  Pregnant women:  1st trimester:  <0 22 - 1 06 ug/ml FEU  2nd trimester:  <0 22 - 1 88 ug/ml FEU  3rd trimester:   0 24 - 3 28 ug/ml FEU    Note: Normal ranges may not apply to patients who are transgender, non-binary, or whose legal sex, sex at birth, and gender identity differ  Narrative: In the evaluation for possible pulmonary embolism, in the appropriate (Well's Score of 4 or less) patient, the age adjusted d-dimer cutoff for this patient can be calculated as:    Age x 0 01 (in ug/mL) for Age-adjusted D-dimer exclusion threshold for a patient over 50 years  HS TROPONIN I 0HR - Normal    hs TnI 0hr <2  "Refer to ACS Flowchart"- see link ng/L Final    Comment:                                              Initial (time 0) result  If >=50 ng/L, Myocardial injury suggested ;  Type of myocardial injury and treatment strategy  to be determined  If 5-49 ng/L, a delta result at 2 hours and or 4 hours will be needed to further evaluate  If <4 ng/L, and chest pain has been >3 hours since onset, patient may qualify for discharge based on the HEART score in the ED  If <5 ng/L and <3hours since onset of chest pain, a delta result at 2 hours will be needed to further evaluate  Second Troponin (time 2 hours)  If calculated delta >= 20 ng/L,  Myocardial injury suggested ; Type of myocardial injury and treatment strategy to be determined  If 5-49 ng/L and the calculated delta is 5-19 ng/L, consult medical service for evaluation  Continue evaluation for ischemia on ecg and other possible etiology and repeat hs troponin at 4 hours  If delta is <5 ng/L at 2 hours, consider discharge based on risk stratification via the HEART score (if in ED), or NIA risk score in IP/Observation     BILIRUBIN, DIRECT - Normal    Bilirubin, Direct 0 16  0 00 - 0 20 mg/dL Final   PREGNANCY TEST (HCG QUALITATIVE) - Normal Preg, Serum Negative  Negative Final       Final Diagnosis:  1  Atypical chest pain    2  Acid reflux        P:  - hospital tx includes   Medications   ondansetron (ZOFRAN) injection 4 mg (4 mg Intravenous Given 2/24/22 0506)   nitroglycerin (NITROSTAT) SL tablet 0 4 mg (0 4 mg Sublingual Given 2/24/22 0509)   ketorolac (TORADOL) injection 15 mg (15 mg Intravenous Given 2/24/22 0508)   Lidocaine Viscous HCl (XYLOCAINE) 2 % mucosal solution 15 mL (15 mL Swish & Spit Given 2/24/22 0526)   aluminum-magnesium hydroxide-simethicone (MYLANTA) oral suspension 30 mL (30 mL Oral Given 2/24/22 0526)   sucralfate (CARAFATE) tablet 1 g (1 g Oral Given 2/24/22 0526)   metoclopramide (REGLAN) injection 10 mg (10 mg Intravenous Given 2/24/22 0525)         - disposition  Time reflects when diagnosis was documented in both MDM as applicable and the Disposition within this note     Time User Action Codes Description Comment    2/24/2022  6:33 AM Te Isbell [R07 89] Atypical chest pain     2/24/2022  6:33 AM Te Isbell [K21 9] Acid reflux       ED Disposition     ED Disposition Condition Date/Time Comment    Discharge Stable u Feb 24, 2022  6:33 AM Marli Buitrago discharge to home/self care  Follow-up Information     Follow up With Specialties Details Why Contact Valente Norris MD Internal Medicine Schedule an appointment as soon as possible for a visit   [address] [place] [address]  [phone]            - patient will call their PCP to let them know they were in the emergency department   We discuss return precautions       - additional tx intended, if consistent with primary provider:  - patient to follow with :      Discharge Medication List as of 2/24/2022  6:34 AM      START taking these medications    Details   famotidine (PEPCID) 20 mg tablet Take 1 tablet (20 mg total) by mouth 2 (two) times a day, Starting Thu 2/24/2022, Normal         CONTINUE these medications which have NOT CHANGED    Details   cholecalciferol (VITAMIN D3) 1,000 units tablet Take 1,000 Units by mouth daily, Historical Med      ibuprofen (MOTRIN) 600 mg tablet Take 600 mg by mouth daily, Historical Med      IRON, FERROUS SULFATE, PO Take by mouth, Historical Med      metFORMIN (GLUCOPHAGE-XR) 500 mg 24 hr tablet TAKE 3 TABS (1,500 MG) BY MOUTH DAILY (WITH BREAKFAST), Historical Med      multivitamin (THERAGRAN) TABS Take 1 tablet by mouth daily, Historical Med      rosuvastatin (CRESTOR) 20 MG tablet Take 1 tablet (20 mg total) by mouth daily at bedtime, Starting Tue 2/23/2021, Normal           No discharge procedures on file  Prior to Admission Medications   Prescriptions Last Dose Informant Patient Reported? Taking? IRON, FERROUS SULFATE, PO  Self Yes No   Sig: Take by mouth   cholecalciferol (VITAMIN D3) 1,000 units tablet  Self Yes No   Sig: Take 1,000 Units by mouth daily   ibuprofen (MOTRIN) 600 mg tablet  Self Yes No   Sig: Take 600 mg by mouth daily   metFORMIN (GLUCOPHAGE-XR) 500 mg 24 hr tablet  Self Yes No   Sig: TAKE 3 TABS (1,500 MG) BY MOUTH DAILY (WITH BREAKFAST)   multivitamin (THERAGRAN) TABS  Self Yes No   Sig: Take 1 tablet by mouth daily   rosuvastatin (CRESTOR) 20 MG tablet   No No   Sig: Take 1 tablet (20 mg total) by mouth daily at bedtime      Facility-Administered Medications: None       Portions of the record may have been created with voice recognition software  Occasional wrong word or "sound a like" substitutions may have occurred due to the inherent limitations of voice recognition software  Read the chart carefully and recognize, using context, where substitutions have occurred      Electronically signed by:  MD Crystal Franco MD  02/26/22 0586

## 2022-02-27 NOTE — ED PROVIDER NOTES
Final Diagnosis:  1  Atypical chest pain    2  Acid reflux        Chief Complaint   Patient presents with    Chest Pain     Patient c/o mid/right chest pain that started last night after eating take-out for the first time in years  States the pain radiates straight through to her back  Also c/o nausea and "a lot" of vomiting yesterday      HPI  Patient presents with chest pain on right side  She's pointing low by her RUQ  She ate fat laden take out for the first time in awhile and that preceeded symptoms by a few hours  She's got nausea and was vomiting last evening  Pain RUQ/ right lower chest but negative abd tenderness neg levy sign  Patient has improvement with viscous lido and maalox so presumed gastritis  No recent travel  No hemoptysis couhg  Nonexertional> no diaphoresis  No radiation      - No language barrier    - History obtained from patient  - There are no limitations to the history obtained  - Previous charting underwent limited review with attention to last ED visits, labs, ekgs, and prior imaging  PMH:   has a past medical history of Diabetes ([facility] [address] ) and Diabetes mellitus ([facility] [address] )  PSH:   has a past surgical history that includes  section  Social History:  ppresents with family     ROS:    Pertinent positives/negatives:   Review of Systems   Cardiovascular: Positive for chest pain  Negative for palpitations and leg swelling  Gastrointestinal: Positive for abdominal pain, nausea and vomiting  Negative for constipation and diarrhea  CONSTITUTIONAL:  No dizziness  No weakness  No unexpected weight loss  EYES:  No pain, erythema, or discharge  No loss of vision  ENT:  No tinnitus, decreased hearing  No epistaxis/purulent drainage  No voice change, airway closing, trismus  RESPIRATORY:  No purulent cough  No hemoptysis  No dyspnea  No paroxysmal nocturnal dyspnea  No stridor, audible wheezing bedside  GENITOURINARY:  No frequency, urgency, nocturia   No hematuria or dysuria  No discharge  No sores/adenopathy  MUSCULOSKELETAL:  No arthralgias or myalgias that are new  INTEGUMENTARY:  No swelling  No unexpected contusions  No abrasions  No lymphangitis  NEUROLOGIC:  No meningismus  No numbness of the extremities  No new focal weakness  No postural instability  PSYCHIATRIC:  No SI HI AVH  HEMATOLOGICAL:  No bleeding  No petechiae  No bruising  ALLERGIES:  No urticaria  No sudden abd cramping  No stridor  PE:     Physical exam highlights:   Physical Exam       Vitals:    02/24/22 0515 02/24/22 0530 02/24/22 0600 02/24/22 0630   BP: 132/85 107/57 116/65 109/64   BP Location: Right arm Right arm Right arm Right arm   Pulse: 75 89 78 76   Resp: 20 17 18 20   Temp:       TempSrc:       SpO2: 95% 96% 97% 96%   Weight:       Height:         Vitals reviewed by me  Nursing note reviewed  Chaperone present for all sensitive exam   Const: No acute distress  Alert  Nontoxic  Not diaphoretic  HEENT: External ears normal  No protrusion drainage swelling  Nose normal  No drainage/traumatic deformity  MMM  Mouth with baseline/symmetric movement  No trismus  Eyes: No squinting  No icterus  Tracks through the room with normal EOM  No tearing/swelling/drainage  Neck: ROM normal  No rigidity  No meningismus  Cards: Rate as per vitals  Compared to monitor sinus unless documented above  Regular  Well perfused  Pulm: able to verbalize without additional effort  Effort and excursion normal  No disress  No audible wheezing/ stridor  Normal resp rate  Abd: No distension beyond baseline  No fluctuant wave  Patient without peritoneal pain with shifting/bumping the bed  MSK: ROM normal and baseline  No deformity  Skin: No new rashes visible  Well perfused  Neuro: Nonfocal  Baseline  CN grossly intact  Moving all four with coordination  Psych: Normal behavior and affect  A:  - Nursing note reviewed      Ddx and MDM      HEART Risk Score      Most Recent Value   Heart Score Risk Calculator    History 0 Filed at: 02/24/2022 0971   ECG 0 Filed at: 02/24/2022 6365   Age 1 Filed at: 02/24/2022 1694   Risk Factors 1 Filed at: 02/24/2022 7149   Troponin 0 Filed at: 02/24/2022 3395   HEART Score 2 Filed at: 02/24/2022 8387         Wells' Criteria for PE      Most Recent Value   Wells' Criteria for PE    Clinical signs and symptoms of DVT 0 Filed at: 02/24/2022 0516   PE is primary diagnosis or equally likely 0 Filed at: 02/24/2022 0452   HR >100 0 Filed at: 02/24/2022 0452   Immobilization at least 3 days or Surgery in the previous 4 weeks 0 Filed at: 02/24/2022 0452   Previous, objectively diagnosed PE or DVT 0 Filed at: 02/24/2022 0452   Hemoptysis 0 Filed at: 02/24/2022 3812   Malignancy with treatment within 6 months or palliative 0 Filed at: 02/24/2022 8647   Wells' Criteria Total 0 Filed at: 02/24/2022 0452         PERC Rule for PE      Most Recent Value   PERC Rule for PE    Age >=50 1 Filed at: 02/24/2022 0453   HR >=100 0 Filed at: 02/24/2022 0453   O2 Sat on room air < 95% 0 Filed at: 02/24/2022 0453   History of PE or DVT 0 Filed at: 02/24/2022 0453   Recent trauma or surgery 0 Filed at: 02/24/2022 0453   Hemoptysis 0 Filed at: 02/24/2022 0453   Exogenous estrogen 0 Filed at: 02/24/2022 0453   Unilateral leg swelling 0 Filed at: 02/24/2022 0453   PERC Rule for PE Results 1 Filed at: 02/24/2022 0453               ED Course as of 02/27/22 0204   Thu Feb 24, 2022   0446 Procedure Note: EKG  Date/Time: 02/24/22 4:46 AM   Interpreted by: Gino Elena  Indications / Diagnosis: CP  ECG reviewed by me, the ED Provider: yes   The EKG demonstrates:  Rhythm: sinus    Intervals: logn   Axis: normal axis  QRS/Blocks: normal QRS  ST Changes: No acute ST Changes, no STD/LOGAN  T wave changes: none         XR chest 1 view portable   Final Result      No acute cardiopulmonary disease                    Workstation performed: [id number]           Orders Placed This Encounter   Procedures    XR chest 1 view portable    CBC and differential    HS Troponin 0hr (reflex protocol)    Comprehensive metabolic panel    Bilirubin, direct    D-dimer, quantitative    hCG, qualitative pregnancy    EKG RESULTS    ECG 12 lead     Labs Reviewed   CBC AND DIFFERENTIAL - Abnormal       Result Value Ref Range Status    WBC 9 28  4 31 - 10 16 Thousand/uL Final    RBC 4 65  3 81 - 5 12 Million/uL Final    Hemoglobin 12 3  11 5 - 15 4 g/dL Final    Hematocrit 39 0  34 8 - 46 1 % Final    MCV 84  82 - 98 fL Final    MCH 26 5 (*) 26 8 - 34 3 pg Final    MCHC 31 5  31 4 - 37 4 g/dL Final    RDW 12 9  11 6 - 15 1 % Final    MPV 11 0  8 9 - 12 7 fL Final    Platelets 476  317 - 390 Thousands/uL Final    nRBC 0  /100 WBCs Final    Neutrophils Relative 80 (*) 43 - 75 % Final    Immat GRANS % 0  0 - 2 % Final    Lymphocytes Relative 15  14 - 44 % Final    Monocytes Relative 4  4 - 12 % Final    Eosinophils Relative 1  0 - 6 % Final    Basophils Relative 0  0 - 1 % Final    Neutrophils Absolute 7 44  1 85 - 7 62 Thousands/µL Final    Immature Grans Absolute 0 03  0 00 - 0 20 Thousand/uL Final    Lymphocytes Absolute 1 36  0 60 - 4 47 Thousands/µL Final    Monocytes Absolute 0 36  0 17 - 1 22 Thousand/µL Final    Eosinophils Absolute 0 05  0 00 - 0 61 Thousand/µL Final    Basophils Absolute 0 04  0 00 - 0 10 Thousands/µL Final   COMPREHENSIVE METABOLIC PANEL - Abnormal    Sodium 141  136 - 145 mmol/L Final    Potassium 3 8  3 5 - 5 3 mmol/L Final    Chloride 103  100 - 108 mmol/L Final    CO2 26  21 - 32 mmol/L Final    ANION GAP 12  4 - 13 mmol/L Final    BUN 7  5 - 25 mg/dL Final    Creatinine 0 48 (*) 0 60 - 1 30 mg/dL Final    Comment: Standardized to IDMS reference method    Glucose 153 (*) 65 - 140 mg/dL Final    Comment: If the patient is fasting, the ADA then defines impaired fasting glucose as > 100 mg/dL and diabetes as > or equal to 123 mg/dL    Specimen collection should occur prior to Sulfasalazine administration due to the potential for falsely depressed results  Specimen collection should occur prior to Sulfapyridine administration due to the potential for falsely elevated results  Calcium 9 1  8 3 - 10 1 mg/dL Final    AST 12  5 - 45 U/L Final    Comment: Specimen collection should occur prior to Sulfasalazine administration due to the potential for falsely depressed results  ALT 24  12 - 78 U/L Final    Comment: Specimen collection should occur prior to Sulfasalazine administration due to the potential for falsely depressed results  Alkaline Phosphatase 78  46 - 116 U/L Final    Total Protein 7 9  6 4 - 8 2 g/dL Final    Albumin 4 0  3 5 - 5 0 g/dL Final    Total Bilirubin 0 68  0 20 - 1 00 mg/dL Final    Comment: Use of this assay is not recommended for patients undergoing treatment with eltrombopag due to the potential for falsely elevated results  eGFR 113  ml/min/1 73sq m Final    Narrative:     [place] guidelines for Chronic Kidney Disease (CKD):     Stage 1 with normal or high GFR (GFR > 90 mL/min/1 73 square meters)    Stage 2 Mild CKD (GFR = 60-89 mL/min/1 73 square meters)    Stage 3A Moderate CKD (GFR = 45-59 mL/min/1 73 square meters)    Stage 3B Moderate CKD (GFR = 30-44 mL/min/1 73 square meters)    Stage 4 Severe CKD (GFR = 15-29 mL/min/1 73 square meters)    Stage 5 End Stage CKD (GFR <15 mL/min/1 73 square meters)  Note: GFR calculation is accurate only with a steady state creatinine   D-DIMER, QUANTITATIVE - Abnormal    D-Dimer, Quant 0 52 (*) <0 50 ug/ml FEU Final    Comment: Reference and upper limits to exclude DVT and PE are the same  Do not use to exclude if clinical symptoms are present    Pregnant women:  1st trimester:  <0 22 - 1 06 ug/ml FEU  2nd trimester:  <0 22 - 1 88 ug/ml FEU  3rd trimester:   0 24 - 3 28 ug/ml FEU    Note: Normal ranges may not apply to patients who are transgender, non-binary, or whose legal sex, sex at birth, and gender identity differ  Narrative: In the evaluation for possible pulmonary embolism, in the appropriate (Well's Score of 4 or less) patient, the age adjusted d-dimer cutoff for this patient can be calculated as:    Age x 0 01 (in ug/mL) for Age-adjusted D-dimer exclusion threshold for a patient over 50 years  HS TROPONIN I 0HR - Normal    hs TnI 0hr <2  "Refer to ACS Flowchart"- see link ng/L Final    Comment:                                              Initial (time 0) result  If >=50 ng/L, Myocardial injury suggested ;  Type of myocardial injury and treatment strategy  to be determined  If 5-49 ng/L, a delta result at 2 hours and or 4 hours will be needed to further evaluate  If <4 ng/L, and chest pain has been >3 hours since onset, patient may qualify for discharge based on the HEART score in the ED  If <5 ng/L and <3hours since onset of chest pain, a delta result at 2 hours will be needed to further evaluate  Second Troponin (time 2 hours)  If calculated delta >= 20 ng/L,  Myocardial injury suggested ; Type of myocardial injury and treatment strategy to be determined  If 5-49 ng/L and the calculated delta is 5-19 ng/L, consult medical service for evaluation  Continue evaluation for ischemia on ecg and other possible etiology and repeat hs troponin at 4 hours  If delta is <5 ng/L at 2 hours, consider discharge based on risk stratification via the HEART score (if in ED), or NIA risk score in IP/Observation  BILIRUBIN, DIRECT - Normal    Bilirubin, Direct 0 16  0 00 - 0 20 mg/dL Final   PREGNANCY TEST (HCG QUALITATIVE) - Normal    Preg, Serum Negative  Negative Final       Final Diagnosis:  1  Atypical chest pain    2   Acid reflux        P:  - hospital tx includes   Medications   ondansetron (ZOFRAN) injection 4 mg (4 mg Intravenous Given 2/24/22 0506)   nitroglycerin (NITROSTAT) SL tablet 0 4 mg (0 4 mg Sublingual Given 2/24/22 0509)   ketorolac (TORADOL) injection 15 mg (15 mg Intravenous Given 2/24/22 9877)   Lidocaine Viscous HCl (XYLOCAINE) 2 % mucosal solution 15 mL (15 mL Swish & Spit Given 2/24/22 0526)   aluminum-magnesium hydroxide-simethicone (MYLANTA) oral suspension 30 mL (30 mL Oral Given 2/24/22 0526)   sucralfate (CARAFATE) tablet 1 g (1 g Oral Given 2/24/22 0526)   metoclopramide (REGLAN) injection 10 mg (10 mg Intravenous Given 2/24/22 0525)         - disposition  Time reflects when diagnosis was documented in both MDM as applicable and the Disposition within this note     Time User Action Codes Description Comment    2/24/2022  6:33 AM Nicky Panda Add [R07 89] Atypical chest pain     2/24/2022  6:33 AM Nicky Panda Add [K21 9] Acid reflux       ED Disposition     ED Disposition Condition Date/Time Comment    Discharge Stable Thu Feb 24, 2022  6:33 AM Marli Buitrago discharge to home/self care  Follow-up Information     Follow up With Specialties Details Why Contact Info    Doreen Schwarz MD Internal Medicine Schedule an appointment as soon as possible for a visit   [address] [place] [address]  [phone]            - patient will call their PCP to let them know they were in the emergency department   We discuss return precautions       - additional tx intended, if consistent with primary provider:  - patient to follow with :      Discharge Medication List as of 2/24/2022  6:34 AM      START taking these medications    Details   famotidine (PEPCID) 20 mg tablet Take 1 tablet (20 mg total) by mouth 2 (two) times a day, Starting Thu 2/24/2022, Normal         CONTINUE these medications which have NOT CHANGED    Details   cholecalciferol (VITAMIN D3) 1,000 units tablet Take 1,000 Units by mouth daily, Historical Med      ibuprofen (MOTRIN) 600 mg tablet Take 600 mg by mouth daily, Historical Med      IRON, FERROUS SULFATE, PO Take by mouth, Historical Med      metFORMIN (GLUCOPHAGE-XR) 500 mg 24 hr tablet TAKE 3 TABS (1,500 MG) BY MOUTH DAILY (WITH BREAKFAST), Historical Med      multivitamin (THERAGRAN) TABS Take 1 tablet by mouth daily, Historical Med      rosuvastatin (CRESTOR) 20 MG tablet Take 1 tablet (20 mg total) by mouth daily at bedtime, Starting Tue 2/23/2021, Normal           No discharge procedures on file  Prior to Admission Medications   Prescriptions Last Dose Informant Patient Reported? Taking? IRON, FERROUS SULFATE, PO  Self Yes No   Sig: Take by mouth   cholecalciferol (VITAMIN D3) 1,000 units tablet  Self Yes No   Sig: Take 1,000 Units by mouth daily   ibuprofen (MOTRIN) 600 mg tablet  Self Yes No   Sig: Take 600 mg by mouth daily   metFORMIN (GLUCOPHAGE-XR) 500 mg 24 hr tablet  Self Yes No   Sig: TAKE 3 TABS (1,500 MG) BY MOUTH DAILY (WITH BREAKFAST)   multivitamin (THERAGRAN) TABS  Self Yes No   Sig: Take 1 tablet by mouth daily   rosuvastatin (CRESTOR) 20 MG tablet   No No   Sig: Take 1 tablet (20 mg total) by mouth daily at bedtime      Facility-Administered Medications: None       Portions of the record may have been created with voice recognition software  Occasional wrong word or "sound a like" substitutions may have occurred due to the inherent limitations of voice recognition software  Read the chart carefully and recognize, using context, where substitutions have occurred      Electronically signed by:  MD Darell Park MD  02/27/22 8618

## 2022-03-14 ENCOUNTER — OFFICE VISIT (OUTPATIENT)
Dept: SURGERY | Facility: CLINIC | Age: 52
End: 2022-03-14

## 2022-03-14 VITALS — HEIGHT: 64 IN | TEMPERATURE: 97.5 F | WEIGHT: 166.4 LBS | BODY MASS INDEX: 28.41 KG/M2

## 2022-03-14 DIAGNOSIS — Z09 POSTOPERATIVE EXAMINATION: Primary | ICD-10-CM

## 2022-03-14 PROCEDURE — 99024 POSTOP FOLLOW-UP VISIT: CPT | Performed by: SURGERY

## 2022-03-14 NOTE — LETTER
March 14, 2022     Patient: Roselia Tomlinson   YOB: 1970   Date of Visit: 3/14/2022       To Whom it May Concern:    Roselia Tomlinson is under my professional care  She was seen in my office on 3/14/2022  She  Should not travel for at least 3 weeks (until 4 April)    If you have any questions or concerns, please don't hesitate to call           Sincerely,          Jeovanny Bazan MD        CC: No Recipients

## 2022-03-14 NOTE — PROGRESS NOTES
Patient is status post laparoscopic cholecystectomy 25 February 2022  Patient reports mild pain but no wound issues  Tolerating a regular diet with normal bowel function  Pathology report:   Final Diagnosis   A  "Gallbladder and contents :  - Acute and chronic cholecystitis, and cholelithiasis  - One reactive lymph node (0/1)  Incisions healing well  No signs of infection  Patient counseled  Follow up p r n

## 2022-10-19 ENCOUNTER — OFFICE VISIT (OUTPATIENT)
Dept: FAMILY MEDICINE CLINIC | Facility: CLINIC | Age: 52
End: 2022-10-19
Payer: COMMERCIAL

## 2022-10-19 VITALS
WEIGHT: 171 LBS | BODY MASS INDEX: 29.19 KG/M2 | OXYGEN SATURATION: 99 % | HEART RATE: 81 BPM | DIASTOLIC BLOOD PRESSURE: 70 MMHG | SYSTOLIC BLOOD PRESSURE: 120 MMHG | TEMPERATURE: 97.4 F | RESPIRATION RATE: 18 BRPM | HEIGHT: 64 IN

## 2022-10-19 DIAGNOSIS — Z00.00 WELL ADULT EXAM: Primary | ICD-10-CM

## 2022-10-19 DIAGNOSIS — Z11.59 ENCOUNTER FOR HEPATITIS C SCREENING TEST FOR LOW RISK PATIENT: ICD-10-CM

## 2022-10-19 DIAGNOSIS — E78.5 HYPERLIPIDEMIA, UNSPECIFIED HYPERLIPIDEMIA TYPE: ICD-10-CM

## 2022-10-19 DIAGNOSIS — R07.89 ATYPICAL CHEST PAIN: ICD-10-CM

## 2022-10-19 DIAGNOSIS — Z23 NEED FOR VACCINATION: ICD-10-CM

## 2022-10-19 DIAGNOSIS — E11.9 TYPE 2 DIABETES MELLITUS WITHOUT COMPLICATION, WITHOUT LONG-TERM CURRENT USE OF INSULIN (HCC): ICD-10-CM

## 2022-10-19 DIAGNOSIS — Z12.39 SCREENING BREAST EXAMINATION: ICD-10-CM

## 2022-10-19 PROBLEM — K81.0 ACUTE CHOLECYSTITIS: Status: RESOLVED | Noted: 2022-02-25 | Resolved: 2022-10-19

## 2022-10-19 PROBLEM — E55.9 VITAMIN D DEFICIENCY: Status: ACTIVE | Noted: 2017-03-07

## 2022-10-19 PROBLEM — R80.9 MICROALBUMINURIA: Status: ACTIVE | Noted: 2017-11-27

## 2022-10-19 PROBLEM — D50.9 IRON DEFICIENCY ANEMIA: Status: ACTIVE | Noted: 2017-11-22

## 2022-10-19 PROCEDURE — 90472 IMMUNIZATION ADMIN EACH ADD: CPT

## 2022-10-19 PROCEDURE — 90750 HZV VACC RECOMBINANT IM: CPT

## 2022-10-19 PROCEDURE — 90471 IMMUNIZATION ADMIN: CPT

## 2022-10-19 PROCEDURE — 99386 PREV VISIT NEW AGE 40-64: CPT | Performed by: INTERNAL MEDICINE

## 2022-10-19 PROCEDURE — 90682 RIV4 VACC RECOMBINANT DNA IM: CPT

## 2022-10-19 RX ORDER — FAMOTIDINE 20 MG/1
20 TABLET, FILM COATED ORAL 2 TIMES DAILY
Qty: 180 TABLET | Refills: 1 | Status: SHIPPED | OUTPATIENT
Start: 2022-10-19

## 2022-10-19 NOTE — PROGRESS NOTES
FAMILY PRACTICE HEALTH MAINTENANCE OFFICE VISIT  [facility]    NAME: Marli Buitrago  AGE: 47 y o  SEX: female  : 1970     DATE: 10/19/2022    Assessment and Plan     1  Well adult exam  -     Ambulatory Referral to Obstetrics / Gynecology; Future    2  Need for vaccination  -     influenza vaccine, quadrivalent, recombinant, PF, 0 5 mL, for patients 18 yr+ (FLUBLOK)  -     Zoster Vaccine Recombinant IM    3  Screening breast examination  -     Mammo screening bilateral w 3d & cad; Future; Expected date: 10/19/2022    4  Type 2 diabetes mellitus without complication, without long-term current use of insulin (HCC)  -     CBC and differential; Future; Expected date: 2023  -     Comprehensive metabolic panel; Future; Expected date: 2023  -     Lipid panel; Future; Expected date: 2023  -     HEMOGLOBIN A1C W/ EAG ESTIMATION; Future; Expected date: 2023  -     CBC and differential  -     Comprehensive metabolic panel  -     Lipid panel  -     HEMOGLOBIN A1C W/ EAG ESTIMATION    5  Hyperlipidemia, unspecified hyperlipidemia type  -     CBC and differential; Future; Expected date: 2023  -     Comprehensive metabolic panel; Future; Expected date: 2023  -     Lipid panel; Future; Expected date: 2023  -     CBC and differential  -     Comprehensive metabolic panel  -     Lipid panel    6  Encounter for hepatitis C screening test for low risk patient  -     Hepatitis C antibody; Future; Expected date: 2023  -     Hepatitis C antibody    7  Atypical chest pain  -     famotidine (PEPCID) 20 mg tablet;  Take 1 tablet (20 mg total) by mouth 2 (two) times a day      Patient Counseling:   Nutrition: Stressed importance of a well balanced diet, moderation of sodium/saturated fat, caloric balance and sufficient intake of fiber  Exercise: Stressed the importance of regular exercise with a goal of 150 minutes per week  Dental Health: Discussed daily flossing and brushing and regular dental visits     Immunizations reviewed: See Orders  Discussed benefits of:  Colon Cancer Screening, Mammogram , Cervical Cancer screening and Screening labs  BMI Counseling: Body mass index is 29 35 kg/m²  Discussed with patient's BMI with her  The BMI is above normal  Nutrition recommendations include reducing portion sizes and decreasing overall calorie intake  Exercise recommendations include moderate aerobic physical activity for 150 minutes/week  Return in about 4 months (around 2/19/2023)  Chief Complaint     Chief Complaint   Patient presents with   • Establish Care     Needs PCP  alex   • shoes off for diabetes exam       History of Present Illness     NP, here for CPE  Has had DM for past 3-4 years  Sees Dr Naye Steven for endocrinology and has lost 30 pounds over past few months  Walks daily for exercise  She would like to start following here as her endocrinologist is leaving  Denies hypoglycemia  Glucose levels anywhere from 100 after exercise to 140  Denies complications  Has seen cardiologist in the past for chest pain but this was felt to be non cardiac  Is on crestor 40 mg and denies side effects         Well Adult Physical   Patient here for a comprehensive physical exam       Diet and Physical Activity  Diet: well balanced diet  Exercise: daily      Depression Screen  PHQ-2/9 Depression Screening    Little interest or pleasure in doing things: 0 - not at all  Feeling down, depressed, or hopeless: 0 - not at all  PHQ-2 Score: 0  PHQ-2 Interpretation: Negative depression screen          General Health  Hearing: Normal:  bilateral  Vision: no vision problems  Dental: regular dental visits    Reproductive Health  No issues  and Follows with gynecologist      The following portions of the patient's history were reviewed and updated as appropriate: allergies, current medications, past family history, past medical history, past social history, past surgical history and problem list     Review of Systems     Review of Systems   Constitutional: Negative  HENT: Negative  Eyes: Negative  Respiratory: Negative  Cardiovascular: Negative  Gastrointestinal: Negative  Endocrine: Negative  Genitourinary: Negative  Musculoskeletal: Negative  Skin: Negative  Allergic/Immunologic: Negative  Neurological: Negative  Hematological: Negative  Psychiatric/Behavioral: Negative          Past Medical History     Past Medical History:   Diagnosis Date   • Diabetes ([facility] )    • Diabetes mellitus ([facility] )     type 2       Past Surgical History     Past Surgical History:   Procedure Laterality Date   •  SECTION     • CHOLECYSTECTOMY     • CHOLECYSTECTOMY LAPAROSCOPIC N/A 2022    Procedure: CHOLECYSTECTOMY LAPAROSCOPIC;  Surgeon: Inderjit Escalante MD;  Location: [facility];  Service: General       Social History     Social History     Socioeconomic History   • Marital status: /Civil Union     Spouse name: None   • Number of children: None   • Years of education: None   • Highest education level: None   Occupational History   • None   Tobacco Use   • Smoking status: Never Smoker   • Smokeless tobacco: Never Used   Vaping Use   • Vaping Use: Never used   Substance and Sexual Activity   • Alcohol use: Never   • Drug use: Never   • Sexual activity: None   Other Topics Concern   • None   Social History Narrative   • None     Social Determinants of Health     Financial Resource Strain: Not on file   Food Insecurity: Not on file   Transportation Needs: Not on file   Physical Activity: Not on file   Stress: Not on file   Social Connections: Not on file   Intimate Partner Violence: Not on file   Housing Stability: Not on file       Family History     Family History   Problem Relation Age of Onset   • Diabetes Mother    • Parkinsonism Mother    • Heart disease Father    • Breast cancer Sister    • No Known Problems Brother    • No Known Problems Maternal Grandmother    • No Known Problems Maternal Grandfather    • No Known Problems Paternal Grandmother    • No Known Problems Paternal Grandfather    • No Known Problems Maternal Aunt    • No Known Problems Maternal Uncle    • No Known Problems Paternal Aunt    • No Known Problems Paternal Uncle        Current Medications       Current Outpatient Medications:   •  cholecalciferol (VITAMIN D3) 1,000 units tablet, Take 1,000 Units by mouth daily, Disp: , Rfl:   •  famotidine (PEPCID) 20 mg tablet, Take 1 tablet (20 mg total) by mouth 2 (two) times a day, Disp: 180 tablet, Rfl: 1  •  IRON, FERROUS SULFATE, PO, Take by mouth, Disp: , Rfl:   •  metFORMIN (GLUCOPHAGE-XR) 500 mg 24 hr tablet, Take 1,000 mg by mouth daily, Disp: , Rfl: 1  •  multivitamin (THERAGRAN) TABS, Take 1 tablet by mouth daily, Disp: , Rfl:   •  rosuvastatin (CRESTOR) 20 MG tablet, Take 1 tablet (20 mg total) by mouth daily at bedtime, Disp: 90 tablet, Rfl: 3  •  Semaglutide (Rybelsus) 7 MG TABS, Take 7 mg by mouth in the morning, Disp: , Rfl:      Allergies     No Known Allergies    Objective     /70   Pulse 81   Temp (!) 97 4 °F (36 3 °C)   Resp 18   Ht 5' 4" (1 626 m)   Wt 77 6 kg (171 lb)   LMP 02/15/2022 (Approximate)   SpO2 99%   BMI 29 35 kg/m²      Physical Exam  Constitutional:       General: She is not in acute distress  Appearance: She is well-developed  She is not diaphoretic  HENT:      Head: Normocephalic and atraumatic  Right Ear: External ear normal       Left Ear: External ear normal    Neck:      Thyroid: No thyromegaly  Cardiovascular:      Rate and Rhythm: Normal rate and regular rhythm  Heart sounds: Normal heart sounds  No murmur heard  No friction rub  No gallop  Pulmonary:      Effort: Pulmonary effort is normal       Breath sounds: Normal breath sounds  No wheezing or rales  Abdominal:      General: Bowel sounds are normal       Palpations: Abdomen is soft   There is no mass  Tenderness: There is no abdominal tenderness  There is no rebound  Musculoskeletal:         General: No deformity  Normal range of motion  Cervical back: Normal range of motion and neck supple  Lymphadenopathy:      Cervical: No cervical adenopathy  Skin:     General: Skin is warm and dry  Findings: No rash  Neurological:      Mental Status: She is alert and oriented to person, place, and time  Cranial Nerves: No cranial nerve deficit  Motor: No abnormal muscle tone  Coordination: Coordination normal       Deep Tendon Reflexes: Reflexes are normal and symmetric  Reflexes normal    Psychiatric:         Behavior: Behavior normal          Thought Content:  Thought content normal          Judgment: Judgment normal            No exam data present        [address] [facility]

## 2022-10-19 NOTE — PROGRESS NOTES
[facility] PRACTICE HEALTH MAINTENANCE OFFICE VISIT  [facility]    NAME: Marli Buitrago  AGE: 47 y o  SEX: female  : 1970     DATE: 10/19/2022    Assessment and Plan     1  Well adult exam  -     Ambulatory Referral to Obstetrics / Gynecology; Future    2  Need for vaccination  -     influenza vaccine, quadrivalent, recombinant, PF, 0 5 mL, for patients 18 yr+ (FLUBLOK)  -     Zoster Vaccine Recombinant IM    3  Screening breast examination  -     Mammo screening bilateral w 3d & cad; Future; Expected date: 10/19/2022    4  Type 2 diabetes mellitus without complication, without long-term current use of insulin (HCC)  -     CBC and differential; Future; Expected date: 2023  -     Comprehensive metabolic panel; Future; Expected date: 2023  -     Lipid panel; Future; Expected date: 2023  -     HEMOGLOBIN A1C W/ EAG ESTIMATION; Future; Expected date: 2023  -     CBC and differential  -     Comprehensive metabolic panel  -     Lipid panel  -     HEMOGLOBIN A1C W/ EAG ESTIMATION    5  Hyperlipidemia, unspecified hyperlipidemia type  -     CBC and differential; Future; Expected date: 2023  -     Comprehensive metabolic panel; Future; Expected date: 2023  -     Lipid panel; Future; Expected date: 2023  -     CBC and differential  -     Comprehensive metabolic panel  -     Lipid panel    6  Encounter for hepatitis C screening test for low risk patient  -     Hepatitis C antibody; Future; Expected date: 2023  -     Hepatitis C antibody    7  Atypical chest pain  -     famotidine (PEPCID) 20 mg tablet;  Take 1 tablet (20 mg total) by mouth 2 (two) times a day      Patient Counseling:   {LK Adult CPE Counseling CZ:79453::"SKVUQEUDM: Stressed importance of a well balanced diet, moderation of sodium/saturated fat, caloric balance and sufficient intake of fiber","Exercise: Stressed the importance of regular exercise with a goal of 150 minutes per week","Dental Health: Discussed daily flossing and brushing and regular dental visits "}    Immunizations reviewed: {LK immunization CPE list:49103}  Discussed benefits of:  {LK Adult CPE Screening counselin}  BMI Counseling: Body mass index is 29 35 kg/m²  Discussed with patient's BMI with her  The BMI {VB BMI Counselin}    Return in about 4 months (around 2023)  Chief Complaint     Chief Complaint   Patient presents with   • Establish Care     Needs PCP  alex   • shoes off for diabetes exam       History of Present Illness     NP, here for CPE  Sees Dr Torey Yoder for endocrinology and has lost 30 pounds over past few months  Walks daily for exercise         Well Adult Physical   Patient here for a comprehensive physical exam       Diet and Physical Activity  Diet: {diet; well adult:58278}  Exercise: {exericse; well adult:72864}      Depression Screen  PHQ-2/9 Depression Screening    Little interest or pleasure in doing things: 0 - not at all  Feeling down, depressed, or hopeless: 0 - not at all  PHQ-2 Score: 0  PHQ-2 Interpretation: Negative depression screen          General Health  Hearing: {WELL ADULT BVLUXEJ:54064}  Vision: {vision; well adult:50404}  Dental: {dental; well adult:98595}    Reproductive Health  { Adult CPE Screening counselin}      The following portions of the patient's history were reviewed and updated as appropriate: allergies, current medications, past family history, past medical history, past social history, past surgical history and problem list     Review of Systems     Review of Systems    Past Medical History     Past Medical History:   Diagnosis Date   • Diabetes ([facility] )    • Diabetes mellitus ([facility] )     type 2       Past Surgical History     Past Surgical History:   Procedure Laterality Date   •  SECTION     • CHOLECYSTECTOMY     • CHOLECYSTECTOMY LAPAROSCOPIC N/A 2022    Procedure: CHOLECYSTECTOMY LAPAROSCOPIC; Surgeon: Dora Zuñiga MD;  Location: [facility] OR;  Service: General       Social History     Social History     Socioeconomic History   • Marital status: /Civil Union     Spouse name: None   • Number of children: None   • Years of education: None   • Highest education level: None   Occupational History   • None   Tobacco Use   • Smoking status: Never Smoker   • Smokeless tobacco: Never Used   Vaping Use   • Vaping Use: Never used   Substance and Sexual Activity   • Alcohol use: Never   • Drug use: Never   • Sexual activity: None   Other Topics Concern   • None   Social History Narrative   • None     Social Determinants of Health     Financial Resource Strain: Not on file   Food Insecurity: Not on file   Transportation Needs: Not on file   Physical Activity: Not on file   Stress: Not on file   Social Connections: Not on file   Intimate Partner Violence: Not on file   Housing Stability: Not on file       Family History     Family History   Problem Relation Age of Onset   • Diabetes Mother    • Parkinsonism Mother    • Heart disease Father    • Breast cancer Sister    • No Known Problems Brother    • No Known Problems Maternal Grandmother    • No Known Problems Maternal Grandfather    • No Known Problems Paternal Grandmother    • No Known Problems Paternal Grandfather    • No Known Problems Maternal Aunt    • No Known Problems Maternal Uncle    • No Known Problems Paternal Aunt    • No Known Problems Paternal Uncle        Current Medications       Current Outpatient Medications:   •  cholecalciferol (VITAMIN D3) 1,000 units tablet, Take 1,000 Units by mouth daily, Disp: , Rfl:   •  famotidine (PEPCID) 20 mg tablet, Take 1 tablet (20 mg total) by mouth 2 (two) times a day, Disp: 180 tablet, Rfl: 1  •  IRON, FERROUS SULFATE, PO, Take by mouth, Disp: , Rfl:   •  metFORMIN (GLUCOPHAGE-XR) 500 mg 24 hr tablet, Take 1,000 mg by mouth daily, Disp: , Rfl: 1  •  multivitamin (THERAGRAN) TABS, Take 1 tablet by mouth daily, Disp: , Rfl:   •  rosuvastatin (CRESTOR) 20 MG tablet, Take 1 tablet (20 mg total) by mouth daily at bedtime, Disp: 90 tablet, Rfl: 3  •  Semaglutide (Rybelsus) 7 MG TABS, Take 7 mg by mouth in the morning, Disp: , Rfl:      Allergies     No Known Allergies    Objective     /70   Pulse 81   Temp (!) 97 4 °F (36 3 °C)   Resp 18   Ht 5' 4" (1 626 m)   Wt 77 6 kg (171 lb)   LMP 02/15/2022 (Approximate)   SpO2 99%   BMI 29 35 kg/m²      Physical Exam      No exam data present        [address] [facility]

## 2022-10-20 ENCOUNTER — TELEPHONE (OUTPATIENT)
Dept: ADMINISTRATIVE | Facility: OTHER | Age: 52
End: 2022-10-20

## 2022-11-03 DIAGNOSIS — E78.5 HYPERLIPIDEMIA, UNSPECIFIED HYPERLIPIDEMIA TYPE: ICD-10-CM

## 2022-11-03 RX ORDER — ROSUVASTATIN CALCIUM 20 MG/1
20 TABLET, COATED ORAL
Qty: 90 TABLET | Refills: 1 | Status: SHIPPED | OUTPATIENT
Start: 2022-11-03

## 2022-11-14 DIAGNOSIS — E11.9 TYPE 2 DIABETES MELLITUS WITHOUT COMPLICATION, WITHOUT LONG-TERM CURRENT USE OF INSULIN (HCC): Primary | ICD-10-CM

## 2022-11-19 ENCOUNTER — OFFICE VISIT (OUTPATIENT)
Dept: FAMILY MEDICINE CLINIC | Facility: CLINIC | Age: 52
End: 2022-11-19

## 2022-11-19 VITALS
SYSTOLIC BLOOD PRESSURE: 120 MMHG | DIASTOLIC BLOOD PRESSURE: 80 MMHG | RESPIRATION RATE: 20 BRPM | BODY MASS INDEX: 30.05 KG/M2 | TEMPERATURE: 97.8 F | HEIGHT: 64 IN | HEART RATE: 92 BPM | WEIGHT: 176 LBS

## 2022-11-19 DIAGNOSIS — J01.00 ACUTE NON-RECURRENT MAXILLARY SINUSITIS: Primary | ICD-10-CM

## 2022-11-19 RX ORDER — FLUTICASONE PROPIONATE 50 MCG
2 SPRAY, SUSPENSION (ML) NASAL DAILY
Qty: 16 G | Refills: 1 | Status: SHIPPED | OUTPATIENT
Start: 2022-11-19

## 2022-11-19 RX ORDER — AMOXICILLIN 875 MG/1
875 TABLET, COATED ORAL 2 TIMES DAILY
Qty: 14 TABLET | Refills: 0 | Status: SHIPPED | OUTPATIENT
Start: 2022-11-19 | End: 2022-11-26

## 2022-11-19 RX ORDER — LANCETS 30 GAUGE
EACH MISCELLANEOUS
COMMUNITY
Start: 2022-06-02

## 2022-11-19 RX ORDER — BLOOD SUGAR DIAGNOSTIC
STRIP MISCELLANEOUS
COMMUNITY
Start: 2022-09-07

## 2022-11-19 NOTE — PROGRESS NOTES
Assessment/Plan:    Reviewed symptomatic treatment  F/u for any persistent/worsening symptoms    1  Acute non-recurrent maxillary sinusitis  -     amoxicillin (AMOXIL) 875 mg tablet; Take 1 tablet (875 mg total) by mouth 2 (two) times a day for 7 days  -     fluticasone (FLONASE) 50 mcg/act nasal spray; 2 sprays into each nostril daily          There are no Patient Instructions on file for this visit  Return if symptoms worsen or fail to improve  Subjective:      Patient ID: Romeo Jacobson is a 47 y o  female  Chief Complaint   Patient presents with   • Sinus Problem     Started one week ago [id number]   • Nasal Congestion   • Cough   • Earache       Has had URI Symptoms for the past week or so  Has worsening sinus pressure  Fever was low grade at onset, Covid testing negative  Traveling to [facility] in a couple of weeks        The following portions of the patient's history were reviewed and updated as appropriate: allergies, current medications, past family history, past medical history, past social history, past surgical history and problem list     Review of Systems   Constitutional: Positive for chills and fever  Negative for fatigue  HENT: Positive for congestion, ear pain, rhinorrhea and sinus pressure  Negative for postnasal drip and sore throat  Respiratory: Negative for cough, shortness of breath and wheezing  Cardiovascular: Negative for chest pain  Gastrointestinal: Negative for abdominal pain, diarrhea, nausea and vomiting  Musculoskeletal: Negative for arthralgias  Skin: Negative for rash  Neurological: Positive for headaches           Current Outpatient Medications   Medication Sig Dispense Refill   • amoxicillin (AMOXIL) 875 mg tablet Take 1 tablet (875 mg total) by mouth 2 (two) times a day for 7 days 14 tablet 0   • cholecalciferol (VITAMIN D3) 1,000 units tablet Take 1,000 Units by mouth daily     • famotidine (PEPCID) 20 mg tablet Take 1 tablet (20 mg total) by mouth 2 (two) times a day 180 tablet 1   • fluticasone (FLONASE) 50 mcg/act nasal spray 2 sprays into each nostril daily 16 g 1   • IRON, FERROUS SULFATE, PO Take by mouth in the morning     • metFORMIN (GLUCOPHAGE-XR) 500 mg 24 hr tablet Take 1,000 mg by mouth daily  1   • multivitamin (THERAGRAN) TABS Take 1 tablet by mouth daily     • OneTouch Delica Lancets 07C MISC TAKE 1 BY SUBCUTANEOUS ROUTE 4 TIMES EVERY DAY     • OneTouch Verio test strip USE BY TEST ROUTE 4 TIMES EVERY DAY  INSURANCE ONLY PAYS FOR 3 TIMES A DAY TESTING     • rosuvastatin (CRESTOR) 20 MG tablet Take 1 tablet (20 mg total) by mouth daily at bedtime 90 tablet 1   • Semaglutide (Rybelsus) 7 MG TABS Take 7 mg by mouth in the morning 90 tablet 3     No current facility-administered medications for this visit  Objective:    /80   Pulse 92   Temp 97 8 °F (36 6 °C)   Resp 20   Ht 5' 4" (1 626 m)   Wt 79 8 kg (176 lb)   BMI 30 21 kg/m²        Physical Exam  Vitals and nursing note reviewed  Constitutional:       Appearance: Normal appearance  She is well-developed and well-nourished  HENT:      Head: Normocephalic and atraumatic  Right Ear: Ear canal and external ear normal  Tympanic membrane is bulging  Left Ear: Ear canal and external ear normal  Tympanic membrane is bulging  Nose: Congestion present  No mucosal edema or rhinorrhea  Right Sinus: Maxillary sinus tenderness present  Left Sinus: Maxillary sinus tenderness present  Mouth/Throat:      Mouth: Oropharynx is clear and moist and mucous membranes are normal       Pharynx: Uvula midline  Eyes:      Conjunctiva/sclera: Conjunctivae normal    Neck:      Thyroid: No thyromegaly  Cardiovascular:      Rate and Rhythm: Normal rate and regular rhythm  Pulses: Intact distal pulses  Heart sounds: Normal heart sounds  No murmur heard  Pulmonary:      Effort: Pulmonary effort is normal       Breath sounds: Normal breath sounds     Abdominal: Palpations: There is no hepatomegaly or splenomegaly  Musculoskeletal:      Cervical back: Neck supple  No edema  Lymphadenopathy:      Cervical:      Right cervical: No superficial cervical adenopathy  Left cervical: No superficial cervical adenopathy  Skin:     General: Skin is warm, dry and intact  Findings: No rash  Neurological:      Mental Status: She is alert     Psychiatric:         Mood and Affect: Mood and affect and mood normal          Behavior: Behavior normal                 Neomia ALESSIA Levin

## 2022-12-11 DIAGNOSIS — J01.00 ACUTE NON-RECURRENT MAXILLARY SINUSITIS: ICD-10-CM

## 2022-12-12 RX ORDER — FLUTICASONE PROPIONATE 50 MCG
SPRAY, SUSPENSION (ML) NASAL
Qty: 16 ML | Refills: 1 | Status: SHIPPED | OUTPATIENT
Start: 2022-12-12

## 2022-12-26 DIAGNOSIS — J01.00 ACUTE NON-RECURRENT MAXILLARY SINUSITIS: ICD-10-CM

## 2022-12-28 RX ORDER — FLUTICASONE PROPIONATE 50 MCG
SPRAY, SUSPENSION (ML) NASAL
Qty: 48 ML | Refills: 1 | Status: SHIPPED | OUTPATIENT
Start: 2022-12-28

## 2023-02-16 ENCOUNTER — TELEPHONE (OUTPATIENT)
Dept: FAMILY MEDICINE CLINIC | Facility: CLINIC | Age: 53
End: 2023-02-16

## 2023-02-16 DIAGNOSIS — R92.8 ABNORMAL MAMMOGRAM: Primary | ICD-10-CM

## 2023-02-16 DIAGNOSIS — Z12.39 SCREENING BREAST EXAMINATION: ICD-10-CM

## 2023-02-16 NOTE — TELEPHONE ENCOUNTER
I called and LMOM for patient to call back regarding mammogram; this was abnormal and has a new nodule  She needs additional studies to evaluate further  I put orders for diagnostic mammogram and ultrasound in her chart

## 2023-02-17 LAB
ALBUMIN SERPL-MCNC: 4.5 G/DL (ref 3.8–4.9)
ALBUMIN/GLOB SERPL: 1.9 {RATIO} (ref 1.2–2.2)
ALP SERPL-CCNC: 79 IU/L (ref 44–121)
ALT SERPL-CCNC: 22 IU/L (ref 0–32)
AST SERPL-CCNC: 24 IU/L (ref 0–40)
BASOPHILS # BLD AUTO: 0 X10E3/UL (ref 0–0.2)
BASOPHILS NFR BLD AUTO: 1 %
BILIRUB SERPL-MCNC: 1 MG/DL (ref 0–1.2)
BUN SERPL-MCNC: 9 MG/DL (ref 6–24)
BUN/CREAT SERPL: 13 (ref 9–23)
CALCIUM SERPL-MCNC: 9.8 MG/DL (ref 8.7–10.2)
CHLORIDE SERPL-SCNC: 103 MMOL/L (ref 96–106)
CHOLEST SERPL-MCNC: 98 MG/DL (ref 100–199)
CHOLEST/HDLC SERPL: 2.1 RATIO (ref 0–4.4)
CO2 SERPL-SCNC: 24 MMOL/L (ref 20–29)
CREAT SERPL-MCNC: 0.69 MG/DL (ref 0.57–1)
EGFR: 104 ML/MIN/1.73
EOSINOPHIL # BLD AUTO: 0.1 X10E3/UL (ref 0–0.4)
EOSINOPHIL NFR BLD AUTO: 3 %
ERYTHROCYTE [DISTWIDTH] IN BLOOD BY AUTOMATED COUNT: 12.6 % (ref 11.7–15.4)
EST. AVERAGE GLUCOSE BLD GHB EST-MCNC: 137 MG/DL
GLOBULIN SER-MCNC: 2.4 G/DL (ref 1.5–4.5)
GLUCOSE SERPL-MCNC: 103 MG/DL (ref 70–99)
HBA1C MFR BLD: 6.4 % (ref 4.8–5.6)
HCT VFR BLD AUTO: 37.1 % (ref 34–46.6)
HCV AB S/CO SERPL IA: NON REACTIVE
HDLC SERPL-MCNC: 46 MG/DL
HGB BLD-MCNC: 12.4 G/DL (ref 11.1–15.9)
IMM GRANULOCYTES # BLD: 0 X10E3/UL (ref 0–0.1)
IMM GRANULOCYTES NFR BLD: 0 %
LDLC SERPL CALC-MCNC: 35 MG/DL (ref 0–99)
LYMPHOCYTES # BLD AUTO: 2.2 X10E3/UL (ref 0.7–3.1)
LYMPHOCYTES NFR BLD AUTO: 45 %
MCH RBC QN AUTO: 27.3 PG (ref 26.6–33)
MCHC RBC AUTO-ENTMCNC: 33.4 G/DL (ref 31.5–35.7)
MCV RBC AUTO: 82 FL (ref 79–97)
MICRODELETION SYND BLD/T FISH: NORMAL
MONOCYTES # BLD AUTO: 0.4 X10E3/UL (ref 0.1–0.9)
MONOCYTES NFR BLD AUTO: 7 %
NEUTROPHILS # BLD AUTO: 2.1 X10E3/UL (ref 1.4–7)
NEUTROPHILS NFR BLD AUTO: 44 %
PLATELET # BLD AUTO: 257 X10E3/UL (ref 150–450)
POTASSIUM SERPL-SCNC: 4.6 MMOL/L (ref 3.5–5.2)
PROT SERPL-MCNC: 6.9 G/DL (ref 6–8.5)
RBC # BLD AUTO: 4.55 X10E6/UL (ref 3.77–5.28)
SL AMB VLDL CHOLESTEROL CALC: 17 MG/DL (ref 5–40)
SODIUM SERPL-SCNC: 140 MMOL/L (ref 134–144)
TRIGL SERPL-MCNC: 84 MG/DL (ref 0–149)
WBC # BLD AUTO: 4.8 X10E3/UL (ref 3.4–10.8)

## 2023-02-20 ENCOUNTER — OFFICE VISIT (OUTPATIENT)
Dept: FAMILY MEDICINE CLINIC | Facility: CLINIC | Age: 53
End: 2023-02-20

## 2023-02-20 VITALS
RESPIRATION RATE: 18 BRPM | TEMPERATURE: 99.2 F | OXYGEN SATURATION: 99 % | DIASTOLIC BLOOD PRESSURE: 82 MMHG | HEART RATE: 93 BPM | SYSTOLIC BLOOD PRESSURE: 118 MMHG | BODY MASS INDEX: 30.73 KG/M2 | WEIGHT: 179 LBS

## 2023-02-20 DIAGNOSIS — Z23 NEED FOR VACCINATION: ICD-10-CM

## 2023-02-20 DIAGNOSIS — D50.8 OTHER IRON DEFICIENCY ANEMIA: ICD-10-CM

## 2023-02-20 DIAGNOSIS — E11.9 TYPE 2 DIABETES MELLITUS WITHOUT COMPLICATION, WITHOUT LONG-TERM CURRENT USE OF INSULIN (HCC): Primary | ICD-10-CM

## 2023-02-20 DIAGNOSIS — E78.5 HYPERLIPIDEMIA, UNSPECIFIED HYPERLIPIDEMIA TYPE: ICD-10-CM

## 2023-02-20 DIAGNOSIS — E55.9 VITAMIN D DEFICIENCY: ICD-10-CM

## 2023-02-20 PROBLEM — R80.9 MICROALBUMINURIA: Status: RESOLVED | Noted: 2017-11-27 | Resolved: 2023-02-20

## 2023-02-20 RX ORDER — BLOOD SUGAR DIAGNOSTIC
1 STRIP MISCELLANEOUS DAILY
Qty: 100 EACH | Refills: 3 | Status: SHIPPED | OUTPATIENT
Start: 2023-02-20

## 2023-02-20 RX ORDER — ROSUVASTATIN CALCIUM 10 MG/1
10 TABLET, COATED ORAL
Qty: 90 TABLET | Refills: 3 | Status: SHIPPED | OUTPATIENT
Start: 2023-02-20

## 2023-02-20 NOTE — PROGRESS NOTES
Name: Anshu Wynn      : 1970      MRN: [id number]  Encounter Provider: Brandon Chou MD  Encounter Date: 2023   Encounter department: [address] [place] [address]     1  Type 2 diabetes mellitus without complication, without long-term current use of insulin ([facility])  Assessment & Plan:    Lab Results   Component Value Date    HGBA1C 6 4 (H) 2023     Well controlled and will continue rybelsus and metformin as ordered  Encouraged low carb diet and exercise, and discussed need for good foot and eye care  Orders:  -     Hemoglobin A1C; Future; Expected date: 2023  -     Comprehensive metabolic panel; Future; Expected date: 2023  -     CBC and Platelet; Future; Expected date: 2023  -     Microalbumin / creatinine urine ratio; Future; Expected date: 2023  -     Lipid Panel with Direct LDL reflex; Future; Expected date: 2023  -     OneTouch Verio test strip; Use 1 each in the morning Use as instructed    2  Hyperlipidemia, unspecified hyperlipidemia type  Assessment & Plan:  Reviewed labs and lipids are very low  Will decrease rosuvastatin to 10 mg daily and will continue to follow lipids  Orders:  -     rosuvastatin (CRESTOR) 10 MG tablet; Take 1 tablet (10 mg total) by mouth daily at bedtime  -     Hemoglobin A1C; Future; Expected date: 2023  -     Comprehensive metabolic panel; Future; Expected date: 2023  -     CBC and Platelet; Future; Expected date: 2023  -     Microalbumin / creatinine urine ratio; Future; Expected date: 2023  -     Lipid Panel with Direct LDL reflex; Future; Expected date: 2023    3  Vitamin D deficiency  -     Iron; Future; Expected date: 2023  -     Ferritin; Future; Expected date: 2023  -     Vitamin D 25 hydroxy; Future; Expected date: 2023  -     Iron  -     Ferritin  -     Vitamin D 25 hydroxy    4  Other iron deficiency anemia  -     Iron;  Future; Expected date: 08/20/2023  -     Ferritin; Future; Expected date: 08/20/2023  -     Vitamin D 25 hydroxy; Future; Expected date: 08/20/2023  -     Iron  -     Ferritin  -     Vitamin D 25 hydroxy  -     Hemoglobin A1C; Future; Expected date: 08/19/2023  -     Comprehensive metabolic panel; Future; Expected date: 08/19/2023  -     CBC and Platelet; Future; Expected date: 08/19/2023  -     Microalbumin / creatinine urine ratio; Future; Expected date: 08/19/2023  -     Lipid Panel with Direct LDL reflex; Future; Expected date: 08/19/2023    5  Need for vaccination  -     Zoster Vaccine Recombinant IM           Subjective      Here for follow up of DM and other issues  She is feeling well  Denies hypoglycemia  Trying to watch her diet, we discussed addition of regular exercise  She stopped coffee and her stomach discomfort resolved  Denies other complaints or issues today  Review of Systems   Constitutional: Negative  Respiratory: Negative  Cardiovascular: Negative  Endocrine: Negative  Current Outpatient Medications on File Prior to Visit   Medication Sig   • metFORMIN (GLUCOPHAGE-XR) 500 mg 24 hr tablet Take 1,000 mg by mouth daily   • multivitamin (THERAGRAN) TABS Take 1 tablet by mouth daily   • OneTouch Delica Lancets 35P MISC TAKE 1 BY SUBCUTANEOUS ROUTE 4 TIMES EVERY DAY   • Semaglutide (Rybelsus) 7 MG TABS Take 7 mg by mouth in the morning   • [DISCONTINUED] OneTouch Verio test strip USE BY TEST ROUTE 4 TIMES EVERY DAY   INSURANCE ONLY PAYS FOR 3 TIMES A DAY TESTING   • [DISCONTINUED] rosuvastatin (CRESTOR) 20 MG tablet Take 1 tablet (20 mg total) by mouth daily at bedtime   • [DISCONTINUED] cholecalciferol (VITAMIN D3) 1,000 units tablet Take 1,000 Units by mouth daily (Patient not taking: Reported on 2/20/2023)   • [DISCONTINUED] famotidine (PEPCID) 20 mg tablet Take 1 tablet (20 mg total) by mouth 2 (two) times a day   • [DISCONTINUED] fluticasone (FLONASE) 50 mcg/act nasal spray SPRAY 2 SPRAYS INTO EACH NOSTRIL EVERY DAY   • [DISCONTINUED] IRON, FERROUS SULFATE, PO Take by mouth in the morning (Patient not taking: Reported on 2/20/2023)       Objective     /82   Pulse 93   Temp 99 2 °F (37 3 °C)   Resp 18   Wt 81 2 kg (179 lb)   SpO2 99%   BMI 30 73 kg/m²     Physical Exam  Constitutional:       Appearance: Normal appearance  She is well-developed  Eyes:      Conjunctiva/sclera: Conjunctivae normal    Neck:      Thyroid: No thyromegaly  Vascular: No JVD  Cardiovascular:      Rate and Rhythm: Normal rate and regular rhythm  Pulses: no weak pulses          Dorsalis pedis pulses are 2+ on the right side and 2+ on the left side  Heart sounds: Normal heart sounds  No murmur heard  No friction rub  No gallop  Pulmonary:      Effort: Pulmonary effort is normal       Breath sounds: Normal breath sounds  No wheezing or rales  Abdominal:      General: Bowel sounds are normal  There is no distension  Palpations: Abdomen is soft  Tenderness: There is no abdominal tenderness  Musculoskeletal:      Cervical back: Neck supple  Feet:      Right foot:      Skin integrity: No ulcer, skin breakdown, erythema, warmth, callus or dry skin  Left foot:      Skin integrity: No ulcer, skin breakdown, erythema, warmth, callus or dry skin  Neurological:      Mental Status: She is alert  Patient's shoes and socks removed  Right Foot/Ankle   Right Foot Inspection  Skin Exam: skin normal and skin intact  No dry skin, no warmth, no callus, no erythema, no maceration, no abnormal color, no pre-ulcer, no ulcer and no callus  Toe Exam: ROM and strength within normal limits  Sensory   Monofilament testing: intact    Vascular  Capillary refills: < 3 seconds  The right DP pulse is 2+  Left Foot/Ankle  Left Foot Inspection  Skin Exam: skin normal and skin intact  No dry skin, no warmth, no erythema, no maceration, normal color, no pre-ulcer, no ulcer and no callus  Toe Exam: ROM and strength within normal limits  Sensory   Monofilament testing: intact    Vascular  Capillary refills: < 3 seconds  The left DP pulse is 2+       Assign Risk Category  No deformity present  No loss of protective sensation  No weak pulses  Risk: 0      Lavina Gosselin, MD

## 2023-02-20 NOTE — ASSESSMENT & PLAN NOTE
Reviewed labs and lipids are very low  Will decrease rosuvastatin to 10 mg daily and will continue to follow lipids

## 2023-02-20 NOTE — ASSESSMENT & PLAN NOTE
Lab Results   Component Value Date    HGBA1C 6 4 (H) 02/16/2023     Well controlled and will continue rybelsus and metformin as ordered  Encouraged low carb diet and exercise, and discussed need for good foot and eye care

## 2023-03-09 ENCOUNTER — TELEPHONE (OUTPATIENT)
Dept: FAMILY MEDICINE CLINIC | Facility: CLINIC | Age: 53
End: 2023-03-09

## 2023-03-09 DIAGNOSIS — R92.8 ABNORMAL MAMMOGRAM: Primary | ICD-10-CM

## 2023-03-09 NOTE — TELEPHONE ENCOUNTER
[facility] Radiology calling stating they are faxing over results from [facility] and U/S   There was a nodule found and needs a biopsy per radiologist   Patient is aware of results and will be calling us for biopsy order      Please keep open until receive fax

## 2023-03-09 NOTE — TELEPHONE ENCOUNTER
I called patient  We have not yet received written report but I printed order for stereotactic biopsy L breast   Please leave up front for patient to

## 2023-03-09 NOTE — TELEPHONE ENCOUNTER
Please call patient  She is calling to speak with Dr Traci Samano  I stated we would call as soon as we get results  Aurelio Spencer would like to know next steps regarding biopsy        Thank you

## 2023-03-10 ENCOUNTER — TELEPHONE (OUTPATIENT)
Dept: FAMILY MEDICINE CLINIC | Facility: CLINIC | Age: 53
End: 2023-03-10

## 2023-03-10 DIAGNOSIS — R92.8 ABNORMAL MAMMOGRAM: Primary | ICD-10-CM

## 2023-03-10 NOTE — TELEPHONE ENCOUNTER
[facility] calling asking for order to be change to U/S guided left breast biopsy  Please fax order with last physical and labs to [phone]  They are aware that dr Julio Mcnamara not here on Fridays

## 2023-05-22 DIAGNOSIS — E11.9 TYPE 2 DIABETES MELLITUS WITHOUT COMPLICATION, WITHOUT LONG-TERM CURRENT USE OF INSULIN (HCC): ICD-10-CM

## 2023-05-22 NOTE — TELEPHONE ENCOUNTER
Patient left a msg requesting a refill for Rybelsus  She mentioned a possible authorization code  We will send the refill and if a Prior Ferne Aver is needed we will proceed from there  Patient is aware       Lashae Andrade MA

## 2023-06-05 ENCOUNTER — TELEPHONE (OUTPATIENT)
Dept: HEMATOLOGY ONCOLOGY | Facility: CLINIC | Age: 53
End: 2023-06-05

## 2023-06-05 PROBLEM — D24.2 FIBROADENOMA OF LEFT BREAST: Status: ACTIVE | Noted: 2023-06-05

## 2023-06-05 NOTE — TELEPHONE ENCOUNTER
Appointment Change  Cancel, Reschedule, Change to Virtual      Who are you speaking with? Patient   If it is not the patient, are they listed on an active communication consent form? N/A   Which provider is the appointment scheduled with? Dr Eng Range   When is the appointment scheduled? Please list date and time  6/5/23 @ 9am   At which location is the appointment scheduled to take place? [facility]   Was the appointment rescheduled or changed from an in person visit to a virtual visit? If so, please list the details of the change  8/9/23 @ 9:30am   What is the reason for the appointment change? Patient thought her appointment was Tuesday 6/6/23 and when she called to confirmed I informed her it was for today   Was [facility] transport scheduled for this visit? no   Does [facility] transport need to be scheduled for the new visit (if applicable) no   Does the patient need an infusion appointment rescheduled? no   Does the patient have an infusion appointment scheduled? If so, when? no   Is the patient undergoing chemotherapy? no   Was the no-show policy reviewed for appointments being changed with less then 24 hours of notice?  yes

## 2023-08-09 ENCOUNTER — CONSULT (OUTPATIENT)
Dept: SURGICAL ONCOLOGY | Facility: CLINIC | Age: 53
End: 2023-08-09
Payer: COMMERCIAL

## 2023-08-09 VITALS
HEART RATE: 90 BPM | BODY MASS INDEX: 31.92 KG/M2 | OXYGEN SATURATION: 99 % | RESPIRATION RATE: 18 BRPM | WEIGHT: 187 LBS | HEIGHT: 64 IN | SYSTOLIC BLOOD PRESSURE: 132 MMHG | DIASTOLIC BLOOD PRESSURE: 84 MMHG | TEMPERATURE: 97.2 F

## 2023-08-09 DIAGNOSIS — N60.02 CYST OF LEFT BREAST: ICD-10-CM

## 2023-08-09 DIAGNOSIS — D24.2 FIBROADENOMA OF LEFT BREAST: Primary | ICD-10-CM

## 2023-08-09 DIAGNOSIS — Z80.3 FAMILY HISTORY OF BREAST CANCER: ICD-10-CM

## 2023-08-09 PROCEDURE — 99203 OFFICE O/P NEW LOW 30 MIN: CPT | Performed by: SURGERY

## 2023-08-09 NOTE — PROGRESS NOTES
Surgical Oncology Consult Note       [address] [facility]  [address] [facility] [address]  [facility] [id number]    Marli Buitrago  1970  [id number]  [address] [facility]  CANCER [facility] [address]      Chief Complaint:     Chief Complaint   Patient presents with   • Consult     Left fibroepithelial lesion       Assessment and Plan:   Assessment/Plan   Patient presents with a new diagnosis of left breast fibroepithelial lesion consistent with a fibroadenoma this was at the 12 o'clock position and 5 mm in size. She also had 2 other abnormalities in the left breast 1 was a nodule of the 12 o'clock position 6 cm from the nipple as well as a complicated cyst at the 773 o'clock position 4 cm from the nipple for which 6-month follow-up were recommended for all of these areas. The patient had 2 sisters with breast cancer one at the age of 61 and 3 in her 76s. Neither of them had genetic testing. I recommended we have imaging and pathology reviews and if consistent then I think it is reasonable to agree with the outside team with 6-month follow-up for all 3 lesions. Also explained that she may need additional imaging following the review. We will see her back in 3 to 4 weeks. Patient and her  are agreeable to this. She is also interested in genetic testing which we will coordinate on a nonurgent basis. Oncology History:     Oncology History    No history exists. History of Present Illness: This is a 58-year-old woman who went for screening mammogram which showed no abnormalities on the right side however the left side there was a nodule. She subsequently had diagnostic mammogram demonstrated suspicious nodule and a biopsy was recommended this was at the 12 o'clock position 1 cm from the nipple. This was performed a clip was placed but found to be discordant.   She had a nodule at 12 o'clock position 6 cm from the nipple as well as a complicated cyst at the 533 o'clock position 4 cm from the nipple. The pathology demonstrated a fibroepithelial lesion consistent with a fibroadenoma at the 12 o'clock position 1 cm from the nipple measuring approximately 5 mm in size. She presents with her  now. Her family history is remarkable for 2 sisters having breast cancer one of the 61 and 1 at the age of 79. Review of Systems:   Review of Systems   Gastrointestinal: Positive for abdominal distention and diarrhea. Musculoskeletal: Positive for neck stiffness. Allergic/Immunologic: Positive for environmental allergies and food allergies. All other systems reviewed and are negative.       Past Medical History:      Patient Active Problem List   Diagnosis   • Type 2 diabetes mellitus without complication ([address])   • Hyperlipidemia   • Iron deficiency anemia   • Vitamin D deficiency   • Fibroadenoma of left breast        Past Medical History:   Diagnosis Date   • Diabetes ([address])    • Diabetes mellitus ([address])     type 2        Past Surgical History:   Procedure Laterality Date   •  SECTION     • CHOLECYSTECTOMY     • CHOLECYSTECTOMY LAPAROSCOPIC N/A 2022    Procedure: CHOLECYSTECTOMY LAPAROSCOPIC;  Surgeon: Mozella Kayser, MD;  Location: [facility];  Service: General        Family History   Problem Relation Age of Onset   • Diabetes Mother    • Parkinsonism Mother    • Heart disease Father    • Breast cancer Sister 64   • Breast cancer Sister 67   • No Known Problems Brother    • No Known Problems Maternal Aunt    • No Known Problems Maternal Grandmother    • No Known Problems Maternal Grandfather    • No Known Problems Paternal Grandmother    • No Known Problems Paternal Grandfather         Social History     Socioeconomic History   • Marital status: /Civil Union     Spouse name: Not on file   • Number of children: Not on file   • Years of education: Not on file   • Highest education level: Not on file   Occupational History   • Not on file   Tobacco Use   • Smoking status: Never   • Smokeless tobacco: Never   Vaping Use   • Vaping Use: Never used   Substance and Sexual Activity   • Alcohol use: Never   • Drug use: Never   • Sexual activity: Not on file   Other Topics Concern   • Not on file   Social History Narrative   • Not on file     Social Determinants of Health     Financial Resource Strain: Not on file   Food Insecurity: Not on file   Transportation Needs: Not on file   Physical Activity: Not on file   Stress: Not on file   Social Connections: Not on file   Intimate Partner Violence: Not on file   Housing Stability: Not on file        Current Outpatient Medications:   •  metFORMIN (GLUCOPHAGE-XR) 500 mg 24 hr tablet, Take 1,000 mg by mouth daily, Disp: , Rfl: 1  •  multivitamin (THERAGRAN) TABS, Take 1 tablet by mouth daily, Disp: , Rfl:   •  OneTouch Delica Lancets 91K MISC, TAKE 1 BY SUBCUTANEOUS ROUTE 4 TIMES EVERY DAY, Disp: , Rfl:   •  OneTouch Verio test strip, Use 1 each in the morning Use as instructed, Disp: 100 each, Rfl: 3  •  rosuvastatin (CRESTOR) 10 MG tablet, Take 1 tablet (10 mg total) by mouth daily at bedtime, Disp: 90 tablet, Rfl: 3  •  semaglutide (Rybelsus) 7 MG tablet, Take 1 tablet (7 mg total) by mouth in the morning, Disp: 90 tablet, Rfl: 0   No Known Allergies    Physical Exam:     Vitals:    08/09/23 0904   BP: 132/84   Pulse: 90   Resp: 18   Temp: (!) 97.2 °F (36.2 °C)   SpO2: 99%     Physical Exam  Vitals reviewed. Constitutional:       Appearance: She is well-developed. HENT:      Head: Normocephalic and atraumatic. Eyes:      Pupils: Pupils are equal, round, and reactive to light. Neck:      Thyroid: No thyromegaly. Vascular: No JVD. Trachea: No tracheal deviation. Cardiovascular:      Rate and Rhythm: Normal rate and regular rhythm. Heart sounds: Normal heart sounds. No murmur heard. No friction rub. No gallop. Pulmonary:      Effort: Pulmonary effort is normal. No respiratory distress. Breath sounds: Normal breath sounds. No wheezing or rales. Chest:      Comments: Both breasts were examined in the sitting and supine position. There are no worrisome skin lesions, no nipple retraction and no nipple discharge. There are no dominant masses, axillary adenopathy or supraclavicular adenopathy on either side. Abdominal:      General: There is no distension. Palpations: Abdomen is soft. There is no hepatomegaly or mass. Tenderness: There is no abdominal tenderness. There is no guarding or rebound. Musculoskeletal:         General: No tenderness. Normal range of motion. Cervical back: Normal range of motion and neck supple. Lymphadenopathy:      Cervical: No cervical adenopathy. Skin:     General: Skin is warm and dry. Findings: No erythema or rash. Neurological:      Mental Status: She is alert and oriented to person, place, and time. Cranial Nerves: No cranial nerve deficit. Psychiatric:         Behavior: Behavior normal.         Results:   Reviewed her imaging and pathology review. Pathology demonstrated a fibroepithelial lesion suggestive of a fibroadenoma. Discussion/Summary:   I recommended we have the image as well as pathology review and see the patient back for definitive planning. My thought would be to favor 6-month follow-up for all abnormalities. But will wait our concordance report. Patient is also interested in genetic testing we will place consult for this. Will see her back in approximately 3 to 4 weeks. Advance Care Planning/Advance Directives:  I discussed the disease status, treatment plans and follow-up with the patient.

## 2023-08-10 ENCOUNTER — TELEPHONE (OUTPATIENT)
Dept: HEMATOLOGY ONCOLOGY | Facility: CLINIC | Age: 53
End: 2023-08-10

## 2023-08-10 NOTE — TELEPHONE ENCOUNTER
I spoke with Marli to schedule their consultation with Cancer Risk and Genetics. Scheduling Outcome: Patient is scheduled for an appointment on 01/25/2024 at 11AM with Ladan Burris     Personal/Family History Related to Appointment:     Personal History of Cancer: Patient reports no personal history of cancer    Family History of Cancer: Patient reports family history of 2 MA sisters- breat ca. MA cousin- oral ca. Is patient of [address] [place] [address],  [address]?: No    History of Genetic Testing:  Personal History of Genetic Testing: Patient report no personal history of Genetic Testing. Family History of Genetic Testing: Patient reports that no family members have had Genetic Testing. Progeny:  Is patient able to complete our family history questionnaire on a computer?:  Yes    Patient's preferred e-mail address: [email or web address]

## 2023-08-13 LAB
25(OH)D3+25(OH)D2 SERPL-MCNC: 28 NG/ML (ref 30–100)
ALBUMIN SERPL-MCNC: 4.5 G/DL (ref 3.8–4.9)
ALBUMIN/CREAT UR: <8 MG/G CREAT (ref 0–29)
ALBUMIN/GLOB SERPL: 1.8 {RATIO} (ref 1.2–2.2)
ALP SERPL-CCNC: 90 IU/L (ref 44–121)
ALT SERPL-CCNC: 20 IU/L (ref 0–32)
AST SERPL-CCNC: 20 IU/L (ref 0–40)
BASOPHILS # BLD AUTO: 0.1 X10E3/UL (ref 0–0.2)
BASOPHILS NFR BLD AUTO: 1 %
BILIRUB SERPL-MCNC: 0.7 MG/DL (ref 0–1.2)
BUN SERPL-MCNC: 9 MG/DL (ref 6–24)
BUN/CREAT SERPL: 14 (ref 9–23)
CALCIUM SERPL-MCNC: 9.7 MG/DL (ref 8.7–10.2)
CHLORIDE SERPL-SCNC: 101 MMOL/L (ref 96–106)
CHOLEST SERPL-MCNC: 123 MG/DL (ref 100–199)
CO2 SERPL-SCNC: 24 MMOL/L (ref 20–29)
CREAT SERPL-MCNC: 0.63 MG/DL (ref 0.57–1)
CREAT UR-MCNC: 35.3 MG/DL
EGFR: 105 ML/MIN/1.73
EOSINOPHIL # BLD AUTO: 0.1 X10E3/UL (ref 0–0.4)
EOSINOPHIL NFR BLD AUTO: 2 %
ERYTHROCYTE [DISTWIDTH] IN BLOOD BY AUTOMATED COUNT: 12.5 % (ref 11.7–15.4)
FERRITIN SERPL-MCNC: 35 NG/ML (ref 15–150)
GLOBULIN SER-MCNC: 2.5 G/DL (ref 1.5–4.5)
GLUCOSE SERPL-MCNC: 134 MG/DL (ref 70–99)
HBA1C MFR BLD: 6.6 % (ref 4.8–5.6)
HCT VFR BLD AUTO: 37.1 % (ref 34–46.6)
HDLC SERPL-MCNC: 49 MG/DL
HGB BLD-MCNC: 11.9 G/DL (ref 11.1–15.9)
IMM GRANULOCYTES # BLD: 0 X10E3/UL (ref 0–0.1)
IMM GRANULOCYTES NFR BLD: 0 %
IRON SERPL-MCNC: 61 UG/DL (ref 27–159)
LDLC SERPL CALC-MCNC: 55 MG/DL (ref 0–99)
LYMPHOCYTES # BLD AUTO: 2.2 X10E3/UL (ref 0.7–3.1)
LYMPHOCYTES NFR BLD AUTO: 37 %
MCH RBC QN AUTO: 27.2 PG (ref 26.6–33)
MCHC RBC AUTO-ENTMCNC: 32.1 G/DL (ref 31.5–35.7)
MCV RBC AUTO: 85 FL (ref 79–97)
MICROALBUMIN UR-MCNC: <3 UG/ML
MICRODELETION SYND BLD/T FISH: NORMAL
MONOCYTES # BLD AUTO: 0.5 X10E3/UL (ref 0.1–0.9)
MONOCYTES NFR BLD AUTO: 8 %
NEUTROPHILS # BLD AUTO: 3.1 X10E3/UL (ref 1.4–7)
NEUTROPHILS NFR BLD AUTO: 52 %
PLATELET # BLD AUTO: 255 X10E3/UL (ref 150–450)
POTASSIUM SERPL-SCNC: 4.5 MMOL/L (ref 3.5–5.2)
PROT SERPL-MCNC: 7 G/DL (ref 6–8.5)
RBC # BLD AUTO: 4.38 X10E6/UL (ref 3.77–5.28)
SODIUM SERPL-SCNC: 138 MMOL/L (ref 134–144)
TRIGL SERPL-MCNC: 104 MG/DL (ref 0–149)
WBC # BLD AUTO: 6 X10E3/UL (ref 3.4–10.8)

## 2023-08-15 ENCOUNTER — LAB REQUISITION (OUTPATIENT)
Dept: LAB | Facility: HOSPITAL | Age: 53
End: 2023-08-15
Payer: COMMERCIAL

## 2023-08-15 DIAGNOSIS — Z80.3 FAMILY HISTORY OF MALIGNANT NEOPLASM OF BREAST: ICD-10-CM

## 2023-08-15 DIAGNOSIS — D24.2 BENIGN NEOPLASM OF LEFT BREAST: ICD-10-CM

## 2023-08-15 DIAGNOSIS — N60.02 SOLITARY CYST OF LEFT BREAST: ICD-10-CM

## 2023-08-15 PROCEDURE — 88321 CONSLTJ&REPRT SLD PREP ELSWR: CPT | Performed by: STUDENT IN AN ORGANIZED HEALTH CARE EDUCATION/TRAINING PROGRAM

## 2023-08-16 ENCOUNTER — HOSPITAL ENCOUNTER (OUTPATIENT)
Dept: MAMMOGRAPHY | Facility: CLINIC | Age: 53
Discharge: HOME | End: 2023-08-16

## 2023-08-16 DIAGNOSIS — Z76.89 REFERRAL OF PATIENT WITHOUT EXAMINATION OR TREATMENT: ICD-10-CM

## 2023-08-17 ENCOUNTER — OFFICE VISIT (OUTPATIENT)
Dept: FAMILY MEDICINE CLINIC | Facility: CLINIC | Age: 53
End: 2023-08-17
Payer: COMMERCIAL

## 2023-08-17 VITALS
SYSTOLIC BLOOD PRESSURE: 130 MMHG | HEIGHT: 64 IN | TEMPERATURE: 98.1 F | DIASTOLIC BLOOD PRESSURE: 68 MMHG | OXYGEN SATURATION: 97 % | RESPIRATION RATE: 16 BRPM | HEART RATE: 96 BPM | WEIGHT: 186.6 LBS | BODY MASS INDEX: 31.86 KG/M2

## 2023-08-17 DIAGNOSIS — E11.9 TYPE 2 DIABETES MELLITUS WITHOUT COMPLICATION, WITHOUT LONG-TERM CURRENT USE OF INSULIN (HCC): Primary | ICD-10-CM

## 2023-08-17 DIAGNOSIS — K59.1 FUNCTIONAL DIARRHEA: ICD-10-CM

## 2023-08-17 DIAGNOSIS — E78.5 HYPERLIPIDEMIA, UNSPECIFIED HYPERLIPIDEMIA TYPE: ICD-10-CM

## 2023-08-17 PROCEDURE — 99213 OFFICE O/P EST LOW 20 MIN: CPT | Performed by: INTERNAL MEDICINE

## 2023-08-17 NOTE — PROGRESS NOTES
Name: Brie Garcia      : 1970      MRN: [id number]  Encounter Provider: Jamie Hammond MD  Encounter Date: 2023   Encounter department:  [facility]     1. Type 2 diabetes mellitus without complication, without long-term current use of insulin ([facility])  Assessment & Plan:    Lab Results   Component Value Date    HGBA1C 6.6 (H) 2023     Very well controlled and will continue semaglutide and metformin as ordered. Encouraged exercise and weight loss. Discussed need for good foot and eye care. Orders:  -     Hemoglobin A1C; Future; Expected date: 2024  -     Comprehensive metabolic panel; Future; Expected date: 2024  -     CBC and Platelet; Future; Expected date: 2024  -     Albumin / creatinine urine ratio; Future; Expected date: 2024  -     Lipid Panel with Direct LDL reflex; Future; Expected date: 2024  -     Hemoglobin A1C  -     Comprehensive metabolic panel  -     CBC and Platelet  -     Albumin / creatinine urine ratio  -     Lipid Panel with Direct LDL reflex    2. Hyperlipidemia, unspecified hyperlipidemia type  Assessment & Plan:  Reviewed labs with patient and lipids are within goal, continue rosuvastatin as ordered with low fat diet. Orders:  -     Hemoglobin A1C; Future; Expected date: 2024  -     Comprehensive metabolic panel; Future; Expected date: 2024  -     CBC and Platelet; Future; Expected date: 2024  -     Albumin / creatinine urine ratio; Future; Expected date: 2024  -     Lipid Panel with Direct LDL reflex; Future; Expected date: 2024  -     Hemoglobin A1C  -     Comprehensive metabolic panel  -     CBC and Platelet  -     Albumin / creatinine urine ratio  -     Lipid Panel with Direct LDL reflex    3. Functional diarrhea  -     Ambulatory Referral to Gastroenterology; Future           Subjective      Here for follow up visit. She denies complaints or issues.  There is no hypoglycemia. Due for eye exam and states she will schedule. No side effects with medications. Review of Systems   Constitutional: Negative. Respiratory: Negative. Cardiovascular: Negative. Endocrine: Negative. Current Outpatient Medications on File Prior to Visit   Medication Sig   • metFORMIN (GLUCOPHAGE-XR) 500 mg 24 hr tablet Take 1,000 mg by mouth daily   • multivitamin (THERAGRAN) TABS Take 1 tablet by mouth daily   • OneTouch Delica Lancets 57R MISC TAKE 1 BY SUBCUTANEOUS ROUTE 4 TIMES EVERY DAY   • OneTouch Verio test strip Use 1 each in the morning Use as instructed   • rosuvastatin (CRESTOR) 10 MG tablet Take 1 tablet (10 mg total) by mouth daily at bedtime   • semaglutide (Rybelsus) 7 MG tablet Take 1 tablet (7 mg total) by mouth in the morning       Objective     /68   Pulse 96   Temp 98.1 °F (36.7 °C) (Temporal)   Resp 16   Ht 5' 4" (1.626 m)   Wt 84.6 kg (186 lb 9.6 oz)   LMP 04/15/2022 (Approximate)   SpO2 97%   BMI 32.03 kg/m²     Physical Exam  Constitutional:       Appearance: She is well-developed. Eyes:      Conjunctiva/sclera: Conjunctivae normal.   Neck:      Thyroid: No thyromegaly. Vascular: No JVD. Cardiovascular:      Rate and Rhythm: Normal rate and regular rhythm. Heart sounds: Normal heart sounds. No murmur heard. No friction rub. No gallop. Pulmonary:      Effort: Pulmonary effort is normal.      Breath sounds: Normal breath sounds. No wheezing or rales. Abdominal:      General: Bowel sounds are normal. There is no distension. Palpations: Abdomen is soft. Tenderness: There is no abdominal tenderness. Musculoskeletal:      Cervical back: Neck supple.        Courtney Joya MD

## 2023-08-17 NOTE — ASSESSMENT & PLAN NOTE
Reviewed labs with patient and lipids are within goal, continue rosuvastatin as ordered with low fat diet.

## 2023-08-17 NOTE — ASSESSMENT & PLAN NOTE
Lab Results   Component Value Date    HGBA1C 6.6 (H) 08/12/2023     Very well controlled and will continue semaglutide and metformin as ordered. Encouraged exercise and weight loss. Discussed need for good foot and eye care.

## 2023-08-18 ENCOUNTER — TELEPHONE (OUTPATIENT)
Dept: HEMATOLOGY ONCOLOGY | Facility: CLINIC | Age: 53
End: 2023-08-18

## 2023-08-18 ENCOUNTER — TELEPHONE (OUTPATIENT)
Dept: SURGICAL ONCOLOGY | Facility: CLINIC | Age: 53
End: 2023-08-18

## 2023-08-18 DIAGNOSIS — N60.02 CYST OF LEFT BREAST: ICD-10-CM

## 2023-08-18 DIAGNOSIS — D24.2 FIBROADENOMA OF LEFT BREAST: Primary | ICD-10-CM

## 2023-08-18 NOTE — TELEPHONE ENCOUNTER
Called the patient to discuss her image review results. Explained that Dr. Alvaro Montez is in agreement with the radiologist's recommendation for her to have a left diagnostic mammogram and ultrasound in October. The patient prefers to have her breast imaging completed at [facility] and would like for this to be coordinated at the [facility] location. Explained that Dr. Alvaro Montez recommended she be seen by Tim Bell after the follow up imaging for a clinical breast exam. The patient stated that if there were any problems she would rather see Dr. Alvaro Montez instead. Explained that if she were to need any surgery, she would be scheduled with Dr. Alvaro Montez; however, it is not indicated at this time. The patient questioned if she needed any right breast imaging done in October as well. Informed the patient that Dr. Alvaro Montez does not think it is necessary at this time since her last right imaging in February was clear. The patient verbalized understanding of everything discussed and is aware that she will receive a phone call from our office to coordinate her imaging and follow up with Jimena A/P yo 33y  s/p , PP#1, stable  1. Pain: OPM  2. GI: Reg  3. :  Voiding  4. DVT prophylaxis: SCDs, ambulation  5. Dispo: PP#2

## 2023-08-18 NOTE — TELEPHONE ENCOUNTER
Confirmed with patient appt. For Mammo and [address] on 10/19/23 at [facility]. and f/u with Armando Khalil on 10/26/23 the [facility].

## 2023-10-03 ENCOUNTER — OFFICE VISIT (OUTPATIENT)
Dept: GASTROENTEROLOGY | Facility: CLINIC | Age: 53
End: 2023-10-03
Payer: COMMERCIAL

## 2023-10-03 VITALS
OXYGEN SATURATION: 98 % | HEIGHT: 64 IN | SYSTOLIC BLOOD PRESSURE: 134 MMHG | DIASTOLIC BLOOD PRESSURE: 96 MMHG | BODY MASS INDEX: 32.27 KG/M2 | HEART RATE: 74 BPM | WEIGHT: 189 LBS

## 2023-10-03 DIAGNOSIS — R19.7 DIARRHEA, UNSPECIFIED TYPE: ICD-10-CM

## 2023-10-03 DIAGNOSIS — D50.8 OTHER IRON DEFICIENCY ANEMIA: Primary | ICD-10-CM

## 2023-10-03 PROCEDURE — 99204 OFFICE O/P NEW MOD 45 MIN: CPT | Performed by: INTERNAL MEDICINE

## 2023-10-03 RX ORDER — METHYLDOPA 500 MG
TABLET ORAL
COMMUNITY

## 2023-10-03 RX ORDER — MONTELUKAST SODIUM 4 MG/1
1 TABLET, CHEWABLE ORAL 2 TIMES DAILY
Qty: 180 TABLET | Refills: 3 | Status: SHIPPED | OUTPATIENT
Start: 2023-10-03

## 2023-10-03 RX ORDER — B-COMPLEX WITH VITAMIN C
TABLET ORAL
COMMUNITY

## 2023-10-03 RX ORDER — VIT C/B6/B5/MAGNESIUM/HERB 173 50-5-6-5MG
CAPSULE ORAL
COMMUNITY

## 2023-10-03 NOTE — PROGRESS NOTES
[facility] - Outpatient Consultation  Lou Buitrago 46 y.o. female MRN: [id number]  Encounter: [id number]      PCP: Henok Serna MD  Referring: Henok Serna MD  [id number] [address] [facility],  [address] [place] [address]      ASSESSMENT AND PLAN:      1. Diarrhea, unspecified type  Suspect bile acid diarrhea s/p cholecystectomy  Differential also includes celiac (given iron/vitamin D deficiency), chronic infectious, microscopic colitis or other  - Ambulatory Referral to Gastroenterology  - Celiac Disease Antibody Profile  - Giardia antigen  - Colonoscopy; Future  - EGD; Future  - sodium picosulfate, magnesium oxide, citric acid (Clenpiq) oral solution; Take 175 mL (1 bottle) the evening before the colonoscopy, between 5 PM and 9 PM, followed by a second 175 mL bottle 5 hours before the colonoscopy. Dispense: 350 mL; Refill: 0  - colestiopl - to take [facility] [place] Meals, start once daily before dinner and increase to dose effect    2. Other iron deficiency anemia  On oral iron supplementation, with appropriate correction  We will plan for bidirectional endoscopic evaluation to rule out GI sources of blood loss  - Colonoscopy; Future  - EGD; Future  - sodium picosulfate, magnesium oxide, citric acid (Clenpiq) oral solution; Take 175 mL (1 bottle) the evening before the colonoscopy, between 5 PM and 9 PM, followed by a second 175 mL bottle 5 hours before the colonoscopy. Dispense: 350 mL; Refill: 0      ______________________________________________________________________    CC:  Chief Complaint   Patient presents with    Diarrhea    Abdominal Cramping       HPI:      Patient is a 41-year-old female referred for diarrhea. She has HLD, T2DM on semaglutide, vitamin D deficiency, breast fibroadenoma, BMI 32. She is s/p cholecystectomy in Feb 2022 for acute cholecystitis and cholelithiasis.   Since then she has been experiencing "stomach upset" with periumbilical abdominal pain that occurs within 5 minutes after eating. She also has associated diarrhea occurring 4-5 times a day. Pain is improved with a bowel movement. She denies any nocturnal stools, blood in her stools. She has noted significant weight gain. Her surgical history is also significant for 2 C-sections. Cologuard negative in . She has recently been diagnosed with iron deficiency, and is on oral iron. Diarrhea     Abdominal Cramping  Associated symptoms include diarrhea. REVIEW OF SYSTEMS:    CONSTITUTIONAL: Denies any fever, chills, rigors, and weight loss. HEENT: No earache or tinnitus. Denies hearing loss or visual disturbances. CARDIOVASCULAR: No chest pain or palpitations. RESPIRATORY: Denies any cough, hemoptysis, shortness of breath or dyspnea on exertion. GASTROINTESTINAL: As noted in the History of Present Illness. GENITOURINARY: No problems with urination. Denies any hematuria or dysuria. NEUROLOGIC: No dizziness or vertigo, denies headaches. MUSCULOSKELETAL: Denies any muscle or joint pain. SKIN: Denies skin rashes or itching. ENDOCRINE: Denies excessive thirst. Denies intolerance to heat or cold. PSYCHOSOCIAL: Denies depression or anxiety. Denies any recent memory loss.        Historical Information   Past Medical History:   Diagnosis Date    Anemia     Diabetes ([facility])     Diabetes mellitus ([facility])     type 2     Past Surgical History:   Procedure Laterality Date     SECTION      CHOLECYSTECTOMY      CHOLECYSTECTOMY LAPAROSCOPIC N/A 2022    Procedure: Violette Sun;  Surgeon: Adithya Prince MD;  Location: [facility] OR;  Service: General     Social History   Social History     Substance and Sexual Activity   Alcohol Use Never     Social History     Substance and Sexual Activity   Drug Use Never     Social History     Tobacco Use   Smoking Status Never   Smokeless Tobacco Never     Family History   Problem Relation Age of Onset    Diabetes Mother     Parkinsonism Mother Heart disease Father     Breast cancer Sister 64    Breast cancer Sister 67    No Known Problems Brother     No Known Problems Maternal Aunt     No Known Problems Maternal Grandmother     No Known Problems Maternal Grandfather     No Known Problems Paternal Grandmother     No Known Problems Paternal Grandfather        Meds/Allergies       Current Outpatient Medications:     Cholecalciferol (Vitamin D3) 125 MCG (5000 UT) TABS    Ferrous Sulfate Dried ER (Slow Release Iron) 160 (50 Fe) MG TBCR    metFORMIN (GLUCOPHAGE-XR) 500 mg 24 hr tablet    multivitamin (THERAGRAN) TABS    OneTouch Delica Lancets 32G MISC    OneTouch Verio test strip    rosuvastatin (CRESTOR) 10 MG tablet    semaglutide (Rybelsus) 7 MG tablet    Turmeric 500 MG CAPS    Vitamin B Complex-C CAPS    No Known Allergies        Objective     Blood pressure 134/96, pulse 74, height 5' 4" (1.626 m), weight 85.7 kg (189 lb), SpO2 98 %. Body mass index is 32.44 kg/m². PHYSICAL EXAM:      General Appearance:   Alert, cooperative, no distress   HEENT:   Normocephalic, atraumatic, anicteric. Neck:  Supple, symmetrical, trachea midline   Lungs:   Clear to auscultation bilaterally; no rales, rhonchi or wheezing; respirations unlabored    Heart[de-identified]   Regular rate and rhythm; no murmur, rub, or gallop.    Abdomen:   Soft, non-tender, non-distended; normal bowel sounds; no masses, no organomegaly    Genitalia:   Deferred    Rectal:   Deferred    Extremities:  No cyanosis, clubbing or edema    Pulses:  2+ and symmetric    Skin:  No jaundice, rashes, or lesions    Lymph nodes:  No palpable cervical lymphadenopathy        Lab Results:     Lab Results   Component Value Date    WBC 6.0 08/12/2023    HGB 11.9 08/12/2023    HCT 37.1 08/12/2023    MCV 85 08/12/2023     08/12/2023       Lab Results   Component Value Date    K 4.5 08/12/2023     08/12/2023    CO2 24 08/12/2023    BUN 9 08/12/2023    CREATININE 0.63 08/12/2023    CALCIUM 9.2 02/25/2022 AST 20 08/12/2023    ALT 20 08/12/2023    ALKPHOS 85 02/25/2022    EGFR 105 08/12/2023       No results found for: "INR", "PROTIME"      Radiology Results:   No results found. Portions of the record may have been created with voice recognition software. Occasional wrong word or "sound a like" substitutions may have occurred due to the inherent limitations of voice recognition software. Read the chart carefully and recognize, using context, where substitutions have occurred.

## 2023-10-03 NOTE — PATIENT INSTRUCTIONS
Scheduled date of EGD/colonoscopy (as of today):11/22/23  Physician performing EGD/colonoscopy:Ortiz  Location of EGD/colonoscopy:[facility]  Desired bowel prep reviewed with patient:Clenpiq  Instructions reviewed with patient by:Sofiya ALEJANDRO  Clearances:   Diabetic

## 2023-10-12 LAB
ENDOMYSIUM IGA SER QL: NEGATIVE
G LAMBLIA AG STL QL IA: NEGATIVE
GLIADIN PEPTIDE IGA SER-ACNC: 7 UNITS (ref 0–19)
GLIADIN PEPTIDE IGG SER-ACNC: 2 UNITS (ref 0–19)
IGA SERPL-MCNC: 268 MG/DL (ref 87–352)
TTG IGA SER-ACNC: <2 U/ML (ref 0–3)
TTG IGG SER-ACNC: 5 U/ML (ref 0–5)

## 2023-10-16 LAB
LEFT EYE DIABETIC RETINOPATHY: NORMAL
RIGHT EYE DIABETIC RETINOPATHY: NORMAL
SEVERITY (EYE EXAM): NORMAL

## 2023-10-19 ENCOUNTER — HOSPITAL ENCOUNTER (OUTPATIENT)
Dept: RADIOLOGY | Facility: HOSPITAL | Age: 53
Discharge: HOME | End: 2023-10-19
Attending: SURGERY
Payer: COMMERCIAL

## 2023-10-19 VITALS — HEIGHT: 64 IN | WEIGHT: 189 LBS | BODY MASS INDEX: 32.27 KG/M2

## 2023-10-19 DIAGNOSIS — N60.02 CYST OF LEFT BREAST: ICD-10-CM

## 2023-10-19 DIAGNOSIS — D24.2 FIBROADENOMA OF LEFT BREAST: ICD-10-CM

## 2023-10-19 PROCEDURE — 76642 ULTRASOUND BREAST LIMITED: CPT

## 2023-10-19 PROCEDURE — 77065 DX MAMMO INCL CAD UNI: CPT

## 2023-10-19 PROCEDURE — G0279 TOMOSYNTHESIS, MAMMO: HCPCS

## 2023-10-26 ENCOUNTER — OFFICE VISIT (OUTPATIENT)
Dept: SURGICAL ONCOLOGY | Facility: CLINIC | Age: 53
End: 2023-10-26
Payer: COMMERCIAL

## 2023-10-26 VITALS
SYSTOLIC BLOOD PRESSURE: 118 MMHG | BODY MASS INDEX: 32.27 KG/M2 | DIASTOLIC BLOOD PRESSURE: 84 MMHG | HEIGHT: 64 IN | HEART RATE: 93 BPM | OXYGEN SATURATION: 97 % | WEIGHT: 189 LBS

## 2023-10-26 DIAGNOSIS — D24.2 FIBROADENOMA OF LEFT BREAST: ICD-10-CM

## 2023-10-26 DIAGNOSIS — N60.02 CYST OF LEFT BREAST: Primary | ICD-10-CM

## 2023-10-26 PROCEDURE — 99213 OFFICE O/P EST LOW 20 MIN: CPT

## 2023-10-26 NOTE — PROGRESS NOTES
Surgical Oncology Follow Up       [address] [facility] [place]  [address] [facility]  [place] [address]    Marli Buitrago  1970  [id number]  [address] [facility] [place]  [address] [facility] [facility] [place] [address]    Diagnoses and all orders for this visit:    Cyst of left breast  -     Mammo diagnostic bilateral w 3d & cad; Future  -     US breast left limited (diagnostic); Future    Fibroadenoma of left breast  -     Mammo diagnostic bilateral w 3d & cad; Future  -     US breast left limited (diagnostic); Future        Chief Complaint   Patient presents with    Follow-up     benign breast f/u, family hx - mammo and US done 10/19       Return in about 6 months (around 4/26/2024) for Office Visit, Imaging - See orders. History of Present Illness: The patient returns to the office today in follow-up for a left breast biopsy-proven fibroadenoma as well as possible cysts. She denies any changes since her last visit. She has not noticed any new breast lumps, skin changes, tenderness or nipple discharge. She does report some occasional soreness of the right chest wall, often after increased activity. Left breast mammogram and US were performed on October 19, BIRADS-3, density category 1. I have reviewed these results myself and discussed them with the patient today. Review of Systems   Constitutional:  Negative for activity change, appetite change, fatigue and unexpected weight change. HENT: Negative. Respiratory: Negative. Cardiovascular: Negative. Gastrointestinal: Negative. Musculoskeletal:  Positive for myalgias (right chest wall). Skin: Negative. Negative for color change and wound. Neurological: Negative. Negative for dizziness and headaches. Hematological: Negative. Negative for adenopathy. Psychiatric/Behavioral: Negative.                Patient Active Problem List   Diagnosis    Type 2 diabetes mellitus without complication (HCC)    Hyperlipidemia    Iron deficiency anemia    Vitamin D deficiency    Fibroadenoma of left breast     Past Medical History:   Diagnosis Date    Anemia     Diabetes ([address])     Diabetes mellitus ([address])     type 2     Past Surgical History:   Procedure Laterality Date    BREAST BIOPSY Left 2023    BREAST BIOPSY Left     over 10 years ago    [address] N/A 02/25/2022    Procedure: CHOLECYSTECTOMY LAPAROSCOPIC;  Surgeon: John Bowers MD;  Location: [facility];  Service: General     Family History   Problem Relation Age of Onset    Diabetes Mother     Parkinsonism Mother     Heart disease Father     Breast cancer Sister 64    Breast cancer Sister 67    No Known Problems Maternal Grandmother     No Known Problems Maternal Grandfather     No Known Problems Paternal Grandmother     No Known Problems Paternal Grandfather     No Known Problems Brother     No Known Problems Maternal Aunt      Social History     Socioeconomic History    Marital status: /Civil Union     Spouse name: Not on file    Number of children: Not on file    Years of education: Not on file    Highest education level: Not on file   Occupational History    Not on file   Tobacco Use    Smoking status: Never    Smokeless tobacco: Never   Vaping Use    Vaping Use: Never used   Substance and Sexual Activity    Alcohol use: Never    Drug use: Never    Sexual activity: Not on file   Other Topics Concern    Not on file   Social History Narrative    Not on file     Social Determinants of Health     Financial Resource Strain: Not on file   Food Insecurity: Not on file   Transportation Needs: Not on file   Physical Activity: Not on file   Stress: Not on file   Social Connections: Not on file   Intimate Partner Violence: Not on file   Housing Stability: Not on file       Current Outpatient Medications:     Cholecalciferol (Vitamin D3) 125 MCG (5000 UT) TABS, Take 5,000 Units by mouth daily, Disp: , Rfl:     colestipol (COLESTID) 1 g tablet, Take 1 tablet (1 g total) by mouth 2 (two) times a day, Disp: 180 tablet, Rfl: 3    Ferrous Sulfate Dried ER (Slow Release Iron) 160 (50 Fe) MG TBCR, Take by mouth, Disp: , Rfl:     metFORMIN (GLUCOPHAGE-XR) 500 mg 24 hr tablet, Take 1,000 mg by mouth daily, Disp: , Rfl: 1    multivitamin (THERAGRAN) TABS, Take 1 tablet by mouth daily, Disp: , Rfl:     OneTouch Delica Lancets 27L MISC, TAKE 1 BY SUBCUTANEOUS ROUTE 4 TIMES EVERY DAY, Disp: , Rfl:     OneTouch Verio test strip, Use 1 each in the morning Use as instructed, Disp: 100 each, Rfl: 3    rosuvastatin (CRESTOR) 10 MG tablet, Take 1 tablet (10 mg total) by mouth daily at bedtime, Disp: 90 tablet, Rfl: 3    semaglutide (Rybelsus) 7 MG tablet, Take 1 tablet (7 mg total) by mouth in the morning, Disp: 90 tablet, Rfl: 0    Turmeric 500 MG CAPS, Take by mouth, Disp: , Rfl:     Vitamin B Complex-C CAPS, Take by mouth, Disp: , Rfl:     sodium picosulfate, magnesium oxide, citric acid (Clenpiq) oral solution, Take 175 mL (1 bottle) the evening before the colonoscopy, between 5 PM and 9 PM, followed by a second 175 mL bottle 5 hours before the colonoscopy. (Patient not taking: Reported on 10/26/2023), Disp: 350 mL, Rfl: 0  No Known Allergies  Vitals:    10/26/23 0906   BP: 118/84   Pulse: 93   SpO2: 97%       Physical Exam  Vitals reviewed. Constitutional:       General: She is not in acute distress. Appearance: Normal appearance. She is normal weight. She is not ill-appearing or toxic-appearing. HENT:      Head: Normocephalic and atraumatic. Nose: Nose normal.      Mouth/Throat:      Mouth: Mucous membranes are moist.   Eyes:      General: No scleral icterus. Conjunctiva/sclera: Conjunctivae normal.   Cardiovascular:      Rate and Rhythm: Normal rate.    Pulmonary:      Effort: Pulmonary effort is normal.   Chest:   Breasts: Right: No inverted nipple, nipple discharge, skin change or tenderness. Left: No inverted nipple, nipple discharge, skin change or tenderness. Comments: Breasts are symmetric bilaterally. Left breast has small firm mass at 12-1:00 position, corresponding to 10mm finding on US. There are no other dominant masses, nodules, skin changes or tenderness. I do not appreciate any adenopathy. Musculoskeletal:         General: Normal range of motion. Cervical back: Normal range of motion and neck supple. Lymphadenopathy:      Cervical: No cervical adenopathy. Upper Body:      Right upper body: No supraclavicular, axillary or pectoral adenopathy. Left upper body: No supraclavicular, axillary or pectoral adenopathy. Skin:     General: Skin is warm and dry. Findings: No erythema. Neurological:      General: No focal deficit present. Mental Status: She is alert and oriented to person, place, and time. Psychiatric:         Mood and Affect: Mood normal.         Behavior: Behavior normal.         Thought Content: Thought content normal.         Judgment: Judgment normal.           Imaging  Mammo diagnostic left w 3d & cad, US breast left limited (diagnostic)    Result Date: 10/19/2023  Narrative: DIAGNOSIS: Cyst of left breast; Fibroadenoma of left breast TECHNIQUE: Digital diagnostic mammography was performed. Computer Aided Detection (CAD) analyzed all applicable images. Left breast ultrasound was performed. COMPARISONS: Prior breast imaging dated: 08/15/2023, 04/04/2023, 04/04/2023, 03/09/2023, 03/09/2023, 02/14/2023, 02/14/2023, 10/15/2021, 01/08/2021, 10/09/2020, 04/12/2019, 11/20/2017, 11/07/2017, 11/07/2017, and 11/07/2017 RELEVANT HISTORY: Family Breast Cancer History: History of breast cancer in Sister, Sister. Family Medical History: Family medical history includes breast cancer in 2 relatives (sister, sister). Personal History: No known relevant hormone history.  Surgical history includes breast biopsy. No known relevant medical history. RISK ASSESSMENT: 5 Year Tyrer-Cuzick: 1.64 % 10 Year Tyrer-Cuzick: 3.61 % Lifetime Tyrer-Cuzick: 12.57 % TISSUE DENSITY: The breasts are almost entirely fatty. INDICATION: Michela Cornelius is a 46 y.o. female presenting for 6 MO F/U of left breast lesions/cyst. FINDINGS: LEFT 1) MASS [A] Mammo diagnostic left w 3d & cad: There are no corresponding masses seen on this modality. US breast left limited (diagnostic): There is a 4 mm x 3 mm x 4 mm oval, parallel, hypoechoic mass with circumscribed margins with no posterior features seen in the left breast at 12 o'clock, 6 cm from the nipple. Compared to the previous outside study dated 4/4/2023, there are no significant changes. Surveillance recommended. 2) MASS [B] Mammo diagnostic left w 3d & cad: There are no corresponding masses seen on this modality. US breast left limited (diagnostic): There is a 7 mm x 4 mm x 8 mm oval, parallel, heterogeneous mass with circumscribed margins with no posterior features seen in the left breast at 4 o'clock, 4 cm from the nipple. Compared to the previous outside study, there are no significant changes. Surveillance recommended. 3) MASS [C] Mammo diagnostic left w 3d & cad: There are no corresponding masses seen on this modality. US breast left limited (diagnostic): There is a 10 mm x 5 mm x 7 mm oval, parallel, hypoechoic mass with circumscribed margins with no posterior features seen in the left breast at 12 o'clock, 6 cm from the nipple. An echogenic clip marker within the nodule confirms this represents the lesion previously biopsied. No further surveillance required.  Biopsy clips and well-circumscribed nodules are stable throughout the breast.  No suspicious mass lesions, areas of architectural distortion, or pleomorphic calcifications throughout the breast.     Impression: Stable benign morphology masses 12:00 and 4:00 locations left breast, probable complicated cysts. Surveillance recommended. ASSESSMENT/BI-RADS CATEGORY: Left: 3 - Probably Benign Overall: 3 - Probably Benign RECOMMENDATION:      - Ultrasound in 6 months for the left breast.      - Diagnostic mammogram in 6 months for both breasts. Workstation ID: [id number]     I personally reviewed and interpreted the above imaging data. Discussion/Summary: This is a 45 y/o female who presents today for continued breast surveillance. Her most recent imaging shows stability in multiple small masses in the left breast, which is reassuring for benign processes. There are no concerning findings on exam today. With regard to her chest wall discomfort, this is likely musculoskeletal.  I have reminded her to wear a very supportive bra, and to let me know if this discomfort becomes more severe or more frequent. I will plan to see her again in 6 months with repeat imaging. She is agreeable to the plan, all questions have been answered.

## 2023-11-08 ENCOUNTER — ANESTHESIA EVENT (OUTPATIENT)
Dept: ANESTHESIOLOGY | Facility: HOSPITAL | Age: 53
End: 2023-11-08

## 2023-11-08 ENCOUNTER — ANESTHESIA (OUTPATIENT)
Dept: ANESTHESIOLOGY | Facility: HOSPITAL | Age: 53
End: 2023-11-08

## 2023-11-22 ENCOUNTER — ANESTHESIA EVENT (OUTPATIENT)
Dept: GASTROENTEROLOGY | Facility: AMBULARY SURGERY CENTER | Age: 53
End: 2023-11-22

## 2023-11-22 ENCOUNTER — ANESTHESIA (OUTPATIENT)
Dept: GASTROENTEROLOGY | Facility: AMBULARY SURGERY CENTER | Age: 53
End: 2023-11-22

## 2023-11-22 ENCOUNTER — HOSPITAL ENCOUNTER (OUTPATIENT)
Dept: GASTROENTEROLOGY | Facility: AMBULARY SURGERY CENTER | Age: 53
Setting detail: OUTPATIENT SURGERY
Discharge: HOME | End: 2023-11-22
Attending: INTERNAL MEDICINE
Payer: COMMERCIAL

## 2023-11-22 VITALS
OXYGEN SATURATION: 98 % | DIASTOLIC BLOOD PRESSURE: 67 MMHG | SYSTOLIC BLOOD PRESSURE: 117 MMHG | RESPIRATION RATE: 16 BRPM | HEART RATE: 87 BPM | TEMPERATURE: 98.1 F

## 2023-11-22 DIAGNOSIS — R19.7 DIARRHEA, UNSPECIFIED TYPE: ICD-10-CM

## 2023-11-22 DIAGNOSIS — D50.8 OTHER IRON DEFICIENCY ANEMIA: ICD-10-CM

## 2023-11-22 PROCEDURE — 43239 EGD BIOPSY SINGLE/MULTIPLE: CPT | Performed by: INTERNAL MEDICINE

## 2023-11-22 PROCEDURE — 45380 COLONOSCOPY AND BIOPSY: CPT | Performed by: INTERNAL MEDICINE

## 2023-11-22 PROCEDURE — 88305 TISSUE EXAM BY PATHOLOGIST: CPT | Performed by: PATHOLOGY

## 2023-11-22 RX ORDER — PROPOFOL 10 MG/ML
INJECTION, EMULSION INTRAVENOUS CONTINUOUS PRN
Status: DISCONTINUED | OUTPATIENT
Start: 2023-11-22 | End: 2023-11-22

## 2023-11-22 RX ORDER — LIDOCAINE HYDROCHLORIDE 10 MG/ML
INJECTION, SOLUTION EPIDURAL; INFILTRATION; INTRACAUDAL; PERINEURAL AS NEEDED
Status: DISCONTINUED | OUTPATIENT
Start: 2023-11-22 | End: 2023-11-22

## 2023-11-22 RX ORDER — PROPOFOL 10 MG/ML
INJECTION, EMULSION INTRAVENOUS AS NEEDED
Status: DISCONTINUED | OUTPATIENT
Start: 2023-11-22 | End: 2023-11-22

## 2023-11-22 RX ORDER — TOBRAMYCIN 3 MG/ML
1 SOLUTION/ DROPS OPHTHALMIC
Status: DISCONTINUED | OUTPATIENT
Start: 2023-11-22 | End: 2023-11-26 | Stop reason: HOSPADM

## 2023-11-22 RX ORDER — SODIUM CHLORIDE, SODIUM LACTATE, POTASSIUM CHLORIDE, CALCIUM CHLORIDE 600; 310; 30; 20 MG/100ML; MG/100ML; MG/100ML; MG/100ML
INJECTION, SOLUTION INTRAVENOUS CONTINUOUS PRN
Status: DISCONTINUED | OUTPATIENT
Start: 2023-11-22 | End: 2023-11-22

## 2023-11-22 RX ADMIN — SODIUM CHLORIDE, SODIUM LACTATE, POTASSIUM CHLORIDE, AND CALCIUM CHLORIDE: .6; .31; .03; .02 INJECTION, SOLUTION INTRAVENOUS at 08:56

## 2023-11-22 RX ADMIN — PROPOFOL 150 MCG/KG/MIN: 10 INJECTION, EMULSION INTRAVENOUS at 09:07

## 2023-11-22 RX ADMIN — PROPOFOL 100 MG: 10 INJECTION, EMULSION INTRAVENOUS at 09:03

## 2023-11-22 RX ADMIN — LIDOCAINE HYDROCHLORIDE 50 MG: 10 INJECTION, SOLUTION EPIDURAL; INFILTRATION; INTRACAUDAL; PERINEURAL at 09:01

## 2023-11-22 RX ADMIN — PROPOFOL 100 MG: 10 INJECTION, EMULSION INTRAVENOUS at 09:02

## 2023-11-22 RX ADMIN — PROPOFOL 150 MG: 10 INJECTION, EMULSION INTRAVENOUS at 09:01

## 2023-11-22 RX ADMIN — PROPOFOL 50 MG: 10 INJECTION, EMULSION INTRAVENOUS at 09:06

## 2023-11-22 NOTE — ANESTHESIA POSTPROCEDURE EVALUATION
Post-Op Assessment Note    CV Status:  Stable  Pain Score: 0    Pain management: adequate       Mental Status:  Sleepy   Hydration Status:  Stable   PONV Controlled:  None   Airway Patency:  Patent  Two or more mitigation strategies used for obstructive sleep apnea   Post Op Vitals Reviewed: Yes    No anethesia notable event occurred.     Staff: CRNA               BP   102/56   Temp 97   Pulse 89   Resp 16   SpO2 99

## 2023-11-22 NOTE — ANESTHESIA PREPROCEDURE EVALUATION
Procedure:  COLONOSCOPY  EGD    Relevant Problems   CARDIO   (+) Hyperlipidemia      ENDO   (+) Type 2 diabetes mellitus without complication (HCC)      HEMATOLOGY   (+) Iron deficiency anemia      BMI 32    Physical Exam    Airway    Mallampati score: II  TM Distance: >3 FB  Neck ROM: full     Dental   No notable dental hx     Cardiovascular      Pulmonary      Other Findings  post-pubertal.      Anesthesia Plan  ASA Score- 2     Anesthesia Type- IV sedation with anesthesia with ASA Monitors. Additional Monitors:     Airway Plan:            Plan Factors-    Chart reviewed. Patient summary reviewed. Induction- intravenous. Postoperative Plan-     Informed Consent- Anesthetic plan and risks discussed with patient. I personally reviewed this patient with the CRNA. Discussed and agreed on the Anesthesia Plan with the CRNA. Micah Real

## 2023-11-22 NOTE — H&P
History and Physical - [facility]  Milla Singer 46 y.o. female MRN: [id number]                  HPI: Milla Singer is a 46y.o. year old female who presents for diarrhea, anemia. REVIEW OF SYSTEMS: Per the HPI, and otherwise unremarkable.     Historical Information   Past Medical History:   Diagnosis Date    Anemia     Diabetes ([facility])     Diabetes mellitus ([facility])     type 2     Past Surgical History:   Procedure Laterality Date    BREAST BIOPSY Left 2023    BREAST BIOPSY Left     over 10 years ago    [address] N/A 02/25/2022    Procedure: CHOLECYSTECTOMY LAPAROSCOPIC;  Surgeon: Tanya Saul MD;  Location: [facility];  Service: General     Social History   Social History     Substance and Sexual Activity   Alcohol Use Never     Social History     Substance and Sexual Activity   Drug Use Never     Social History     Tobacco Use   Smoking Status Never   Smokeless Tobacco Never     Family History   Problem Relation Age of Onset    Diabetes Mother     Parkinsonism Mother     Heart disease Father     Breast cancer Sister 64    Breast cancer Sister 67    No Known Problems Maternal Grandmother     No Known Problems Maternal Grandfather     No Known Problems Paternal Grandmother     No Known Problems Paternal Grandfather     No Known Problems Brother     No Known Problems Maternal Aunt        Meds/Allergies       Current Outpatient Medications:     Cholecalciferol (Vitamin D3) 125 MCG (5000 UT) TABS    Ferrous Sulfate Dried ER (Slow Release Iron) 160 (50 Fe) MG TBCR    metFORMIN (GLUCOPHAGE-XR) 500 mg 24 hr tablet    multivitamin (THERAGRAN) TABS    rosuvastatin (CRESTOR) 10 MG tablet    semaglutide (Rybelsus) 7 MG tablet    Turmeric 500 MG CAPS    Vitamin B Complex-C CAPS    colestipol (COLESTID) 1 g tablet    OneTouch Delica Lancets 59X MISC    OneTouch Verio test strip    No Known Allergies    Objective     /73 Pulse 81   Temp 98.1 °F (36.7 °C) (Temporal)   Resp 18   LMP 04/15/2022 (Approximate)   SpO2 97%       PHYSICAL EXAM    Gen: NAD  Head: NCAT  CV: RRR  CHEST: Clear  ABD: soft, NT/ND  EXT: no edema      ASSESSMENT/PLAN:  This is a 46y.o. year old female here for EGD & colonoscopy, and she is stable and optimized for her procedure.

## 2023-11-28 PROCEDURE — 88305 TISSUE EXAM BY PATHOLOGIST: CPT | Performed by: PATHOLOGY

## 2024-01-12 ENCOUNTER — OFFICE VISIT (OUTPATIENT)
Dept: OBGYN CLINIC | Facility: CLINIC | Age: 54
End: 2024-01-12
Payer: COMMERCIAL

## 2024-01-12 VITALS — BODY MASS INDEX: 32.61 KG/M2 | WEIGHT: 190 LBS | DIASTOLIC BLOOD PRESSURE: 80 MMHG | SYSTOLIC BLOOD PRESSURE: 120 MMHG

## 2024-01-12 DIAGNOSIS — N95.0 POSTMENOPAUSAL BLEEDING: ICD-10-CM

## 2024-01-12 DIAGNOSIS — Z01.419 ENCOUNTER FOR GYNECOLOGICAL EXAMINATION WITHOUT ABNORMAL FINDING: Primary | ICD-10-CM

## 2024-01-12 DIAGNOSIS — R10.2 PELVIC PAIN: Primary | ICD-10-CM

## 2024-01-12 PROCEDURE — 58100 BIOPSY OF UTERUS LINING: CPT | Performed by: OBSTETRICS & GYNECOLOGY

## 2024-01-12 PROCEDURE — G0476 HPV COMBO ASSAY CA SCREEN: HCPCS | Performed by: OBSTETRICS & GYNECOLOGY

## 2024-01-12 PROCEDURE — 99386 PREV VISIT NEW AGE 40-64: CPT | Performed by: OBSTETRICS & GYNECOLOGY

## 2024-01-12 PROCEDURE — G0145 SCR C/V CYTO,THINLAYER,RESCR: HCPCS | Performed by: OBSTETRICS & GYNECOLOGY

## 2024-01-12 PROCEDURE — 88305 TISSUE EXAM BY PATHOLOGIST: CPT | Performed by: PATHOLOGY

## 2024-01-12 NOTE — PROGRESS NOTES
Subjective      Marli Buitrago is a 53 y.o.  female who presents for new patient annual well woman exam.  Patient has had no menses for or about 1 and half years leading up to menopause she had some irregular bleeding and had an ultrasound and endometrial biopsy in  that was benign.  Her ultrasound showed some fibroids and she had previously had some polyps that had been removed by D&C hysteroscopy.  This most recent cycle seemed like a normal cycle including breast tenderness and increase in acne and was just last week and has ended.  No intermenstrual bleeding, spotting, or discharge.  Patient reports Yes  hot flashes/night sweats, No pain problems intercourse, Yes  vaginal dryness reviewed options to treat, sleeping well.   Patient is leaving in 2 weeks for a month-long trip to [facility] to tour all temples.  Patient is an artist, schedule varies.  Has noted some weight gain with menopause discussed methods to address slowing of metabolism.  Patient has 2 older sisters to have both had breast cancer, also other siblings and aunts and cousins without additional significant cancer.  Patient is planned for genetic testing.    Current contraception: post menopausal status  History of abnormal Pap smear: no (last Pap around )  Family history of uterine or ovarian cancer: no  Regular self breast exam: yes  History of abnormal mammogram: yes -history of benign breast biopsies  Family history of breast cancer: yes -2 of her sisters was at age 56 and 1 at age 72 they live in [place] and did not have genetic testing    Menstrual History:  OB History               Para        Term                AB        Living             SAB        IAB        Ectopic        Multiple        Live Births   2           Obstetric Comments   Menarche age 12  Age at first live birth 23  Perimenopausal at age 52              Menarche age: 12  Patient's last menstrual period was 04/15/2022 (approximate).       Past  Medical History:   Diagnosis Date    Anemia     Diabetes (HCC)     Diabetes mellitus (HCC)     type 2     Past Surgical History:   Procedure Laterality Date    BREAST BIOPSY Left     BREAST BIOPSY Left     over 10 years ago     SECTION      CHOLECYSTECTOMY      CHOLECYSTECTOMY LAPAROSCOPIC N/A 2022    Procedure: CHOLECYSTECTOMY LAPAROSCOPIC;  Surgeon: Antione Somers MD;  Location: [facility];  Service: General     OB History               Para        Term                AB        Living             SAB        IAB        Ectopic        Multiple        Live Births   2           Obstetric Comments   Menarche age 12  Age at first live birth 23  Perimenopausal at age 52             Current Outpatient Medications   Medication Instructions    colestipol (COLESTID) 1 g, Oral, 2 times daily    Ferrous Sulfate Dried ER (Slow Release Iron) 160 (50 Fe) MG TBCR Oral    metFORMIN (GLUCOPHAGE-XR) 1,000 mg, Oral, Daily    multivitamin (THERAGRAN) TABS 1 tablet, Oral, Daily    OneTouch Delica Lancets 30G MISC TAKE 1 BY SUBCUTANEOUS ROUTE 4 TIMES EVERY DAY    OneTouch Verio test strip 1 each, Other, Daily, Use as instructed    rosuvastatin (CRESTOR) 10 mg, Oral, Daily at bedtime    semaglutide (RYBELSUS) 7 mg, Oral, Daily    Turmeric 500 MG CAPS Oral    Vitamin B Complex-C CAPS Oral    Vitamin D3 5,000 Units, Oral, Daily     No Known Allergies  Social History     Tobacco Use    Smoking status: Never    Smokeless tobacco: Never   Vaping Use    Vaping status: Never Used   Substance Use Topics    Alcohol use: Never    Drug use: Never       Review of Systems  Review of Systems   Constitutional:  Negative for fatigue, fever and unexpected weight change.   HENT:  Negative for dental problem, sinus pressure and sinus pain.    Eyes:  Negative for visual disturbance.   Respiratory:  Negative for cough, shortness of breath and wheezing.    Cardiovascular:  Negative for chest pain and leg swelling.    Gastrointestinal:  Negative for blood in stool, constipation, diarrhea, nausea and vomiting.   Endocrine: Negative for cold intolerance, heat intolerance and polydipsia.   Genitourinary:  Negative for dysuria, frequency, hematuria, menstrual problem and pelvic pain.   Musculoskeletal:  Negative for arthralgias and back pain.   Neurological:  Negative for dizziness, seizures and headaches.   Psychiatric/Behavioral:  The patient is not nervous/anxious.         Objective   Vitals:    01/12/24 0757   BP: 120/80   Weight: 86.2 kg (190 lb)     General:   alert and oriented, in no acute distress   Heart: regular rate and rhythm, S1, S2 normal, no murmur, click, rub or gallop   Lungs: clear to auscultation bilaterally   Abdomen: soft, non-tender, without masses or organomegaly   Vulva: normal   Vagina: normal mucosa, mild atrophic changes   Cervix: no bleeding following Pap, no cervical motion tenderness, no lesions, and nulliparous appearing   Uterus: normal size, mobile, very mild tenderness at right fundus   Adnexa: normal adnexa and no mass, fullness, tenderness   Breast inspection negative, no nipple discharge or bleeding, no masses or nodularity palpable  Rectal deferred, just had colonoscopy November 2023 due in 5 years  Thyroid normal no masses or nodules    Endometrial biopsy    Date/Time: 1/12/2024 8:00 AM    Performed by: Danielle Mariscal MD  Authorized by: Danielle Mariscal MD  Universal Protocol:  Consent: Verbal consent obtained.  Risks and benefits: risks, benefits and alternatives were discussed  Consent given by: patient  Patient understanding: patient states understanding of the procedure being performed  Patient identity confirmed: verbally with patient    Indication:     Indications: Post-menopausal bleeding      Progression of post-menopausal bleeding:  Resolved  Procedure:     Procedure: endometrial biopsy with Pipelle      A bivalve speculum was placed in the vagina: yes      Cervix cleaned and  prepped: yes      A paracervical block was performed: no      An intracervical block was performed: no      Uterus sound depth (cm):  8    Specimen collected: specimen collected and sent to pathology      Patient tolerated procedure well with no complications: yes    Findings:     Uterus size:  Non-gravid    Cervix: normal      Adnexa: normal    Comments:     Procedure comments:  After Pap smear cervix cleansed with Betadine and grasped at 12 o'clock with Allis, biopsy Pipelle passed to 8 cm's rotated through 360 degrees obtaining specimen x 2 passes patient tolerated very well         Assessment   53-year-old G2, P2 here for new patient annual exam had 1 episode of postmenopausal bleeding that seems like a period.  History of endometrial biopsy in 2021 and history of polyps previously removed also has history of fibroids.  Had follow-up mammogram and ultrasound October 2023 where benign biopsies were obtained already scheduled for bilateral diagnostic with follow-up ultrasound     Plan   ThinPrep with cotesting performed as well as endometrial biopsy return based on results or in 1 year or sooner as needed

## 2024-01-13 LAB
ALBUMIN SERPL-MCNC: 4.8 G/DL (ref 3.8–4.9)
ALBUMIN/CREAT UR: <20 MG/G CREAT (ref 0–29)
ALBUMIN/GLOB SERPL: 1.8 {RATIO} (ref 1.2–2.2)
ALP SERPL-CCNC: 92 IU/L (ref 44–121)
ALT SERPL-CCNC: 21 IU/L (ref 0–32)
AST SERPL-CCNC: 20 IU/L (ref 0–40)
BILIRUB SERPL-MCNC: 0.9 MG/DL (ref 0–1.2)
BUN SERPL-MCNC: 11 MG/DL (ref 6–24)
BUN/CREAT SERPL: 17 (ref 9–23)
CALCIUM SERPL-MCNC: 9.5 MG/DL (ref 8.7–10.2)
CHLORIDE SERPL-SCNC: 102 MMOL/L (ref 96–106)
CHOLEST SERPL-MCNC: 106 MG/DL (ref 100–199)
CO2 SERPL-SCNC: 23 MMOL/L (ref 20–29)
CREAT SERPL-MCNC: 0.66 MG/DL (ref 0.57–1)
CREAT UR-MCNC: 15.3 MG/DL
EGFR: 104 ML/MIN/1.73
ERYTHROCYTE [DISTWIDTH] IN BLOOD BY AUTOMATED COUNT: 12.7 % (ref 11.7–15.4)
EST. AVERAGE GLUCOSE BLD GHB EST-MCNC: 146 MG/DL
GLOBULIN SER-MCNC: 2.6 G/DL (ref 1.5–4.5)
GLUCOSE SERPL-MCNC: 111 MG/DL (ref 70–99)
HBA1C MFR BLD: 6.7 % (ref 4.8–5.6)
HCT VFR BLD AUTO: 40.3 % (ref 34–46.6)
HDLC SERPL-MCNC: 41 MG/DL
HGB BLD-MCNC: 13 G/DL (ref 11.1–15.9)
LDLC SERPL CALC-MCNC: 45 MG/DL (ref 0–99)
LDLC/HDLC SERPL: 1.1 RATIO (ref 0–3.2)
MCH RBC QN AUTO: 26.7 PG (ref 26.6–33)
MCHC RBC AUTO-ENTMCNC: 32.3 G/DL (ref 31.5–35.7)
MCV RBC AUTO: 83 FL (ref 79–97)
MICROALBUMIN UR-MCNC: <3 UG/ML
MICRODELETION SYND BLD/T FISH: NORMAL
PLATELET # BLD AUTO: 291 X10E3/UL (ref 150–450)
POTASSIUM SERPL-SCNC: 4.3 MMOL/L (ref 3.5–5.2)
PROT SERPL-MCNC: 7.4 G/DL (ref 6–8.5)
RBC # BLD AUTO: 4.86 X10E6/UL (ref 3.77–5.28)
SL AMB VLDL CHOLESTEROL CALC: 20 MG/DL (ref 5–40)
SODIUM SERPL-SCNC: 140 MMOL/L (ref 134–144)
TRIGL SERPL-MCNC: 111 MG/DL (ref 0–149)
WBC # BLD AUTO: 6 X10E3/UL (ref 3.4–10.8)

## 2024-01-15 LAB
HPV HR 12 DNA CVX QL NAA+PROBE: NEGATIVE
HPV16 DNA CVX QL NAA+PROBE: NEGATIVE
HPV18 DNA CVX QL NAA+PROBE: NEGATIVE

## 2024-01-18 ENCOUNTER — OFFICE VISIT (OUTPATIENT)
Dept: FAMILY MEDICINE CLINIC | Facility: CLINIC | Age: 54
End: 2024-01-18
Payer: COMMERCIAL

## 2024-01-18 VITALS
TEMPERATURE: 97.2 F | DIASTOLIC BLOOD PRESSURE: 66 MMHG | HEIGHT: 64 IN | HEART RATE: 84 BPM | BODY MASS INDEX: 32.54 KG/M2 | WEIGHT: 190.6 LBS | SYSTOLIC BLOOD PRESSURE: 124 MMHG | RESPIRATION RATE: 18 BRPM

## 2024-01-18 DIAGNOSIS — Z00.00 WELL ADULT EXAM: Primary | ICD-10-CM

## 2024-01-18 DIAGNOSIS — E78.5 HYPERLIPIDEMIA, UNSPECIFIED HYPERLIPIDEMIA TYPE: ICD-10-CM

## 2024-01-18 DIAGNOSIS — E11.9 TYPE 2 DIABETES MELLITUS WITHOUT COMPLICATION, WITHOUT LONG-TERM CURRENT USE OF INSULIN (HCC): ICD-10-CM

## 2024-01-18 DIAGNOSIS — Z23 NEED FOR VACCINATION: ICD-10-CM

## 2024-01-18 LAB
LAB AP GYN PRIMARY INTERPRETATION: NORMAL
Lab: NORMAL

## 2024-01-18 PROCEDURE — 90715 TDAP VACCINE 7 YRS/> IM: CPT

## 2024-01-18 PROCEDURE — 90471 IMMUNIZATION ADMIN: CPT

## 2024-01-18 PROCEDURE — 99213 OFFICE O/P EST LOW 20 MIN: CPT | Performed by: INTERNAL MEDICINE

## 2024-01-18 PROCEDURE — 88305 TISSUE EXAM BY PATHOLOGIST: CPT | Performed by: PATHOLOGY

## 2024-01-18 PROCEDURE — 99396 PREV VISIT EST AGE 40-64: CPT | Performed by: INTERNAL MEDICINE

## 2024-01-18 RX ORDER — METFORMIN HYDROCHLORIDE 500 MG/1
500 TABLET, EXTENDED RELEASE ORAL DAILY
Start: 2024-01-18

## 2024-01-18 NOTE — ASSESSMENT & PLAN NOTE
Reviewed labs with patient. Well controlled on rosuvastatin and will continue as ordered.  Encouraged physical activity.

## 2024-01-18 NOTE — PROGRESS NOTES
FAMILY PRACTICE HEALTH MAINTENANCE OFFICE VISIT  [facility]    NAME: Marli Buitrago  AGE: 53 y.o. SEX: female  : 1970     DATE: 2024    Assessment and Plan     1. Well adult exam    2. Type 2 diabetes mellitus without complication, without long-term current use of insulin ([facility])  Assessment & Plan:    Lab Results   Component Value Date    HGBA1C 6.7 (H) 2024     Stable but having diarrhea from metformin. Will try to decrease the dose and see if this helps her symptoms. Follow up after labs in 4 months.  Discussed need for good foot and eye care.     Orders:  -     metFORMIN (GLUCOPHAGE-XR) 500 mg 24 hr tablet; Take 1 tablet (500 mg total) by mouth daily  -     Hemoglobin A1C; Future; Expected date: 2024  -     Comprehensive metabolic panel; Future; Expected date: 2024  -     CBC and Platelet; Future; Expected date: 2024  -     Albumin / creatinine urine ratio; Future; Expected date: 2024  -     Lipid Panel with Direct LDL reflex; Future; Expected date: 2024  -     Hemoglobin A1C  -     Comprehensive metabolic panel  -     CBC and Platelet  -     Albumin / creatinine urine ratio  -     Lipid Panel with Direct LDL reflex    3. Hyperlipidemia, unspecified hyperlipidemia type  Assessment & Plan:  Reviewed labs with patient. Well controlled on rosuvastatin and will continue as ordered.  Encouraged physical activity.    Orders:  -     Hemoglobin A1C; Future; Expected date: 2024  -     Comprehensive metabolic panel; Future; Expected date: 2024  -     CBC and Platelet; Future; Expected date: 2024  -     Albumin / creatinine urine ratio; Future; Expected date: 2024  -     Lipid Panel with Direct LDL reflex; Future; Expected date: 2024  -     Hemoglobin A1C  -     Comprehensive metabolic panel  -     CBC and Platelet  -     Albumin / creatinine urine ratio  -     Lipid Panel with Direct LDL reflex    4.  Need for vaccination  -     TDAP VACCINE GREATER THAN OR EQUAL TO 6YO IM        Patient Counseling:   Nutrition: Stressed importance of a well balanced diet, moderation of sodium/saturated fat, caloric balance and sufficient intake of fiber  Exercise: Stressed the importance of regular exercise with a goal of 150 minutes per week  Dental Health: Discussed daily flossing and brushing and regular dental visits     Immunizations reviewed: See Orders  Discussed benefits of:  Colon Cancer Screening, Mammogram , Cervical Cancer screening, and Screening labs.  BMI Counseling: Body mass index is 32.72 kg/m². Discussed with patient's BMI with her. The BMI is above normal. Nutrition recommendations include reducing portion sizes and decreasing overall calorie intake. Exercise recommendations include moderate aerobic physical activity for 150 minutes/week.    Return in about 4 months (around 5/18/2024).        Chief Complaint     Chief Complaint   Patient presents with   • Physical Exam     HK CMA        History of Present Illness     Here for CPE.  She is going to [place] for a month with her friend, leaves on 1/28.  Has been having a lot of diarrhea, she thinks since she started metformin.  Has had colonoscopy and gluten testing (borderline).  She stopped gluten and this helped things minimally. The only time she felt good was the last time she was in [place] and forgot her metformin.        Well Adult Physical   Patient here for a comprehensive physical exam.      Diet and Physical Activity  Diet: low fat diet  Exercise: frequently      Depression Screen  PHQ-2/9 Depression Screening    Little interest or pleasure in doing things: 0 - not at all  Feeling down, depressed, or hopeless: 0 - not at all  PHQ-2 Score: 0  PHQ-2 Interpretation: Negative depression screen          General Health  Hearing: Normal:  bilateral  Vision: no vision problems  Dental: regular dental visits    Reproductive Health  No issues  and Follows with  gynecologist      The following portions of the patient's history were reviewed and updated as appropriate: allergies, current medications, past family history, past medical history, past social history, past surgical history and problem list.    Review of Systems     Review of Systems   Constitutional:  Negative for chills and fever.   HENT:  Negative for ear pain and sore throat.    Eyes:  Negative for pain and visual disturbance.   Respiratory:  Negative for cough and shortness of breath.    Cardiovascular:  Negative for chest pain and palpitations.   Gastrointestinal:  Positive for diarrhea. Negative for abdominal pain and vomiting.   Genitourinary:  Negative for dysuria and hematuria.   Musculoskeletal:  Negative for arthralgias and back pain.   Skin:  Negative for color change and rash.   Neurological:  Negative for seizures and syncope.   All other systems reviewed and are negative.      Past Medical History     Past Medical History:   Diagnosis Date   • Anemia    • Diabetes (HCC)    • Diabetes mellitus (HCC)     type 2       Past Surgical History     Past Surgical History:   Procedure Laterality Date   • BREAST BIOPSY Left    • BREAST BIOPSY Left     over 10 years ago   •  SECTION     • CHOLECYSTECTOMY     • CHOLECYSTECTOMY LAPAROSCOPIC N/A 2022    Procedure: CHOLECYSTECTOMY LAPAROSCOPIC;  Surgeon: Antione Somers MD;  Location: [facility];  Service: General       Social History     Social History     Socioeconomic History   • Marital status: /Civil Union     Spouse name: None   • Number of children: None   • Years of education: None   • Highest education level: None   Occupational History   • None   Tobacco Use   • Smoking status: Never   • Smokeless tobacco: Never   Vaping Use   • Vaping status: Never Used   Substance and Sexual Activity   • Alcohol use: Never   • Drug use: Never   • Sexual activity: Yes     Partners: Male   Other Topics Concern   • None   Social History Narrative  "  • None     Social Determinants of Health     Financial Resource Strain: Not on file   Food Insecurity: Not on file   Transportation Needs: Not on file   Physical Activity: Not on file   Stress: Not on file   Social Connections: Not on file   Intimate Partner Violence: Not on file   Housing Stability: Not on file       Family History     Family History   Problem Relation Age of Onset   • Diabetes Mother    • Parkinsonism Mother    • Heart disease Father    • Breast cancer Sister 56   • Breast cancer Sister 72   • No Known Problems Maternal Grandmother    • No Known Problems Maternal Grandfather    • No Known Problems Paternal Grandmother    • No Known Problems Paternal Grandfather    • No Known Problems Brother    • No Known Problems Maternal Aunt        Current Medications       Current Outpatient Medications:   •  Cholecalciferol (Vitamin D3) 125 MCG (5000 UT) TABS, Take 5,000 Units by mouth daily, Disp: , Rfl:   •  Ferrous Sulfate Dried ER (Slow Release Iron) 160 (50 Fe) MG TBCR, Take by mouth, Disp: , Rfl:   •  metFORMIN (GLUCOPHAGE-XR) 500 mg 24 hr tablet, Take 1 tablet (500 mg total) by mouth daily, Disp: , Rfl:   •  multivitamin (THERAGRAN) TABS, Take 1 tablet by mouth daily, Disp: , Rfl:   •  OneTouch Delica Lancets 30G MISC, TAKE 1 BY SUBCUTANEOUS ROUTE 4 TIMES EVERY DAY, Disp: , Rfl:   •  OneTouch Verio test strip, Use 1 each in the morning Use as instructed, Disp: 100 each, Rfl: 3  •  rosuvastatin (CRESTOR) 10 MG tablet, Take 1 tablet (10 mg total) by mouth daily at bedtime, Disp: 90 tablet, Rfl: 3  •  semaglutide (Rybelsus) 7 MG tablet, Take 1 tablet (7 mg total) by mouth in the morning, Disp: 90 tablet, Rfl: 0  •  Turmeric 500 MG CAPS, Take by mouth, Disp: , Rfl:   •  Vitamin B Complex-C CAPS, Take by mouth, Disp: , Rfl:      Allergies     No Known Allergies    Objective     /66   Pulse 84   Temp (!) 97.2 °F (36.2 °C)   Resp 18   Ht 5' 4\" (1.626 m)   Wt 86.5 kg (190 lb 9.6 oz)   LMP " 04/15/2022 (Approximate)   BMI 32.72 kg/m²      Physical Exam  Constitutional:       General: She is not in acute distress.     Appearance: She is well-developed. She is not diaphoretic.   HENT:      Head: Normocephalic and atraumatic.      Right Ear: External ear normal.      Left Ear: External ear normal.   Neck:      Thyroid: No thyromegaly.   Cardiovascular:      Rate and Rhythm: Normal rate and regular rhythm.      Heart sounds: Normal heart sounds. No murmur heard.     No friction rub. No gallop.   Pulmonary:      Effort: Pulmonary effort is normal.      Breath sounds: Normal breath sounds. No wheezing or rales.   Abdominal:      General: Bowel sounds are normal.      Palpations: Abdomen is soft. There is no mass.      Tenderness: There is no abdominal tenderness. There is no rebound.   Musculoskeletal:         General: No deformity. Normal range of motion.      Cervical back: Normal range of motion and neck supple.   Lymphadenopathy:      Cervical: No cervical adenopathy.   Skin:     General: Skin is warm and dry.      Findings: No rash.   Neurological:      Mental Status: She is alert and oriented to person, place, and time.      Cranial Nerves: No cranial nerve deficit.      Motor: No abnormal muscle tone.      Coordination: Coordination normal.      Deep Tendon Reflexes: Reflexes are normal and symmetric. Reflexes normal.   Psychiatric:         Behavior: Behavior normal.         Thought Content: Thought content normal.         Judgment: Judgment normal.           Vision Screening    Right eye Left eye Both eyes   Without correction 20/20 20/20 20/20   With correction              Kira Garcia MD  [facility]

## 2024-01-18 NOTE — ASSESSMENT & PLAN NOTE
Lab Results   Component Value Date    HGBA1C 6.7 (H) 01/12/2024     Stable but having diarrhea from metformin. Will try to decrease the dose and see if this helps her symptoms. Follow up after labs in 4 months.  Discussed need for good foot and eye care.

## 2024-01-19 ENCOUNTER — TELEPHONE (OUTPATIENT)
Dept: ADMINISTRATIVE | Facility: OTHER | Age: 54
End: 2024-01-19

## 2024-01-19 NOTE — TELEPHONE ENCOUNTER
----- Message from Kira Garcia MD sent at 1/18/2024 11:52 AM EST -----  01/18/24 11:52 AM    Hello, our patient No patient name on file. has had Diabetic Eye Exam completed/performed. Please assist in updating the patient chart by making an External outreach to [facility] facility located in [place], [place]. The date of service is 2023.    Thank you,  Kira Garcia  [facility]

## 2024-01-22 ENCOUNTER — TELEPHONE (OUTPATIENT)
Dept: GENETICS | Facility: CLINIC | Age: 54
End: 2024-01-22

## 2024-01-22 NOTE — TELEPHONE ENCOUNTER
Upon review of the In Basket request and the patient's chart, initial outreach has been made via telephone call to facility. Please see Contacts section for details.  Will send documentation over    Thank you  Nora Bowers MA

## 2024-01-22 NOTE — TELEPHONE ENCOUNTER
I called and left a message for Marli to confirm her upcoming appointment, she was encouraged to call our office to confirm her appointment or log into her mychart.

## 2024-01-25 ENCOUNTER — CLINICAL SUPPORT (OUTPATIENT)
Dept: GENETICS | Facility: CLINIC | Age: 54
End: 2024-01-25

## 2024-01-25 DIAGNOSIS — Z80.3 FAMILY HISTORY OF BREAST CANCER: Primary | ICD-10-CM

## 2024-01-25 NOTE — PROGRESS NOTES
Pre-Test Genetic Counseling Consult Note    Patient Name: Marli Buitrago   /Age: 1970/53 y.o.  Referring Provider: Hu Sanchez MD, PhD, FACS    Date of Service: 2024  Genetic Counselor: Mary Stephens MS, [facility]  Genetic Counseling Student: Chyna Ann, genetic counseling student completed today's appointment under my supervision  Interpretation Services: None  Location: In-person consult at [place]  Length of Visit: 60 Minutes    Marli was referred to the [facility] Risk and Genetic Assessment Program due to her family history of breast cancer . she presents today to discuss the possibility of a hereditary cancer syndrome, options for genetic testing, and implications for her and her family. Her  accompanied her to the appointment.        Cancer History and Treatment:   Personal History:   Oncology History    No history exists.       Screening Hx:   Breast (following with Surg Onc):  Breast Imaging: 2023    DIAGNOSIS: Cyst of left breast; Fibroadenoma of left breast      IMPRESSION:  Stable benign morphology masses 12:00 and 4:00 locations left breast, probable complicated cysts.  Surveillance recommended.    RECOMMENDATION:       - Ultrasound in 6 months for the left breast.       - Diagnostic mammogram in 6 months for both breasts.    Breast Biopsy: 2023    Fibroadenoma and ductal hyperplasia (usual). These results are benign and concordant with the Imaging finding.    Breast Density: Almost entirely fatty    Colon:  Colonoscopy: 2023, recommended follow up in 5 years  1 polyps reported    Gynecologic:  Ovaries and Uterus intact     Skin:  No current screening    Other screening: none    Reproductive History  Age at menarche: 12  Age at first live birth:  23  Menopause: Perimenopausal  Hormone replacement: none    Medical and Surgical History  Pertinent surgical history:   Past Surgical History:   Procedure Laterality Date    BREAST BIOPSY Left      "BREAST BIOPSY Left     over 10 years ago     SECTION      CHOLECYSTECTOMY      CHOLECYSTECTOMY LAPAROSCOPIC N/A 2022    Procedure: CHOLECYSTECTOMY LAPAROSCOPIC;  Surgeon: Antione Somers MD;  Location: [facility];  Service: General      Pertinent medical history:  Past Medical History:   Diagnosis Date    Anemia     Diabetes (HCC)     Diabetes mellitus (HCC)     type 2         Other History:  Height:   Ht Readings from Last 1 Encounters:   24 5' 4\" (1.626 m)     Weight:   Wt Readings from Last 1 Encounters:   24 86.5 kg (190 lb 9.6 oz)       Relevant Family History   Patient reports no Ashkenazi Religion ancestry.     Marli reported 2 half-sisters with histories of breast cancer, one in her 50s and the other in at 70. Marli shares the same father as her half sisters, however their mother is Marli's maternal aunt.    Other notable family history includes a maternal first cousin with a history of oral cancer    No other reported maternal or paternal family history of cancer    Please refer to the scanned pedigree in the Media Tab for a complete family history     *All history is reported as provided by the patient; records are not available for review, except where indicated.     Assessment:  We discussed sporadic, familial and hereditary cancer.  We also discussed the many factors that influence our risk for cancer such as age, environmental exposures, lifestyle choices and family history.      We reviewed the indications suggestive of a hereditary predisposition to cancer.    Marli is unaffected, but is considering genetic testing due to a family history of breast cancer. Although Marli's paternal half-sister is the most informative person to undergo genetic testing, Marli can consider genetic testing due to the following:   Patient does not have cancer but is the most informative person to test because their half sister is .    Genetic testing is indicated for Maril based on " the following criteria: Meets NCCN V2.2024 Testing Criteria for High-Penetrance Breast Cancer Susceptibility Genes: family history of a second-degree relative diagnosed with breast cancer with 2 or more close-blood relatives diagnosed with breast cancer at any age.    The risks, benefits, and limitations of genetic testing were reviewed with the patient, as well as genetic discrimination laws, and possible test results (positive, negative, variants of uncertain significance) and their clinical implications. If positive for a mutation, options for managing cancer risk including increased surveillance, chemoprevention, and in some cases prophylactic surgery were discussed. Marli was informed that if a hereditary cancer syndrome was identified in her, first degree relatives (parents, siblings, and children) have a chance of also inheriting the condition. Genetic testing would allow for predictive genetic testing in other relatives, who may also be at risk depending on their degree of relation.     Billing:  Most individuals pay <$100 for hereditary cancer genetic testing. If insurance covers the cost of the testing, individuals may still pay out of pocket secondary to co-pays, co-insurance, or deductibles. If the cost of the testing exceeds $100, the lab will reach out to the patient via phone or e-mail. The patient will then have the option to proceed with the testing, cancel the testing, or elect the self-pay option of $250. Marli verbalized understanding.     Plan: Patient decided not to proceed with testing at this time.    Summary:     Patient declined testing at this time as she wants to think more about the impact of genetic testing. She also has a trip to [facility] planned and would like to wait until she returns to proceed with testing. Encouraged the patient to call us if/when she is ready to proceed with testing.    Genetic Testing Planned: CustomNext: Cancer® +RNAinsight® (61 genes): APC, YOUSIF, AXIN2, BAP1,  BARD1, BMPR1A, BRCA1, BRCA2, BRIP1, CDH1, CDK4, CDKN1B, CDKN2A, CHEK2, CTNNA1, DICER1, EGLN1, EPCAM, FH, FLCN, GREM1, HOXB13, KIF1B, KIT, MAX, MEN1, MET, MITF, MLH1, MSH2, MSH3, MSH6, MUTYH, NBN, NF1, NTHL1, PALB2, PMS2, POLD1, POLE, POT1, PTEN, RAD50, RAD51C, RAD51D, RB1, RECQL, RET, SDHA, SDHAF2, SDHB, SDHC, SDHD, SMAD4, SMARCA4, STK11, NNDZ260, TP53, TSC1, TSC2, VHL      Marli will be notified via [employer] once results are available.

## 2024-01-25 NOTE — LETTER
2024     Hu Sanchez MD  [address]  [address]nd Floor  [facility] [address]    Patient: Marli Buitrago  YOB: 1970  Date of Visit: 2024      Dear Dr. Sanchez:    Thank you for referring Marli Buitrago to me for evaluation. Below are my notes for this consultation.    If you have questions, please do not hesitate to call me. I look forward to following your patient along with you.         Sincerely,        Mary Stephens        CC: Kira Garcia MD        Pre-Test Genetic Counseling Consult Note    Patient Name: Marli Buitrago   /Age: 1970/53 y.o.  Referring Provider: Hu Sanchez MD, PhD, FACS    Date of Service: 2024  Genetic Counselor: Mary Stephens MS, [facility] [place]  Genetic Counseling Student: Chyna Ann, genetic counseling student completed today's appointment under my supervision  Interpretation Services: None  Location: In-person consult at [place]  Length of Visit: 60 Minutes    Marli was referred to the [facility] Cancer Risk and Genetic Assessment Program due to her family history of breast cancer . she presents today to discuss the possibility of a hereditary cancer syndrome, options for genetic testing, and implications for her and her family. Her  accompanied her to the appointment.        Cancer History and Treatment:   Personal History:   Oncology History    No history exists.       Screening Hx:   Breast (following with Surg Onc):  Breast Imaging: 2023    DIAGNOSIS: Cyst of left breast; Fibroadenoma of left breast      IMPRESSION:  Stable benign morphology masses 12:00 and 4:00 locations left breast, probable complicated cysts.  Surveillance recommended.    RECOMMENDATION:       - Ultrasound in 6 months for the left breast.       - Diagnostic mammogram in 6 months for both breasts.    Breast Biopsy: 2023    Fibroadenoma and ductal hyperplasia (usual). These results are benign and concordant with the Imaging  "finding.    Breast Density: Almost entirely fatty    Colon:  Colonoscopy: 2023, recommended follow up in 5 years  1 polyps reported    Gynecologic:  Ovaries and Uterus intact     Skin:  No current screening    Other screening: none    Reproductive History  Age at menarche: 12  Age at first live birth:  23  Menopause: Perimenopausal  Hormone replacement: none    Medical and Surgical History  Pertinent surgical history:   Past Surgical History:   Procedure Laterality Date   • BREAST BIOPSY Left    • BREAST BIOPSY Left     over 10 years ago   •  SECTION     • CHOLECYSTECTOMY     • CHOLECYSTECTOMY LAPAROSCOPIC N/A 2022    Procedure: CHOLECYSTECTOMY LAPAROSCOPIC;  Surgeon: Antione Somers MD;  Location: [facility];  Service: General      Pertinent medical history:  Past Medical History:   Diagnosis Date   • Anemia    • Diabetes (HCC)    • Diabetes mellitus (HCC)     type 2         Other History:  Height:   Ht Readings from Last 1 Encounters:   24 5' 4\" (1.626 m)     Weight:   Wt Readings from Last 1 Encounters:   24 86.5 kg (190 lb 9.6 oz)       Relevant Family History   Patient reports no Ashkenazi Caodaism ancestry.     Marli reported 2 half-sisters with histories of breast cancer, one in her 50s and the other in at 70. Marli shares the same father as her half sisters, however their mother is Marli's maternal aunt.    Other notable family history includes a maternal first cousin with a history of oral cancer    No other reported maternal or paternal family history of cancer    Please refer to the scanned pedigree in the Media Tab for a complete family history     *All history is reported as provided by the patient; records are not available for review, except where indicated.     Assessment:  We discussed sporadic, familial and hereditary cancer.  We also discussed the many factors that influence our risk for cancer such as age, environmental exposures, lifestyle choices and " family history.      We reviewed the indications suggestive of a hereditary predisposition to cancer.    Marli is unaffected, but is considering genetic testing due to a family history of breast cancer. Although Marli's paternal half-sister is the most informative person to undergo genetic testing, Marli can consider genetic testing due to the following:   Patient does not have cancer but is the most informative person to test because their half sister is .    Genetic testing is indicated for Marli based on the following criteria: Meets NCCN V2.2024 Testing Criteria for High-Penetrance Breast Cancer Susceptibility Genes: family history of a second-degree relative diagnosed with breast cancer with 2 or more close-blood relatives diagnosed with breast cancer at any age.    The risks, benefits, and limitations of genetic testing were reviewed with the patient, as well as genetic discrimination laws, and possible test results (positive, negative, variants of uncertain significance) and their clinical implications. If positive for a mutation, options for managing cancer risk including increased surveillance, chemoprevention, and in some cases prophylactic surgery were discussed. Marli was informed that if a hereditary cancer syndrome was identified in her, first degree relatives (parents, siblings, and children) have a chance of also inheriting the condition. Genetic testing would allow for predictive genetic testing in other relatives, who may also be at risk depending on their degree of relation.     Billing:  Most individuals pay <$100 for hereditary cancer genetic testing. If insurance covers the cost of the testing, individuals may still pay out of pocket secondary to co-pays, co-insurance, or deductibles. If the cost of the testing exceeds $100, the lab will reach out to the patient via phone or e-mail. The patient will then have the option to proceed with the testing, cancel the testing, or elect the  self-pay option of $250. Marli verbalized understanding.     Plan: Patient decided not to proceed with testing at this time.    Summary:     Patient declined testing at this time as she wants to think more about the impact of genetic testing. She also has a trip to [facility] planned and would like to wait until she returns to proceed with testing. Encouraged the patient to call us if/when she is ready to proceed with testing.    Genetic Testing Planned: CustomNext: Cancer® +SurIDx® (61 genes): APC, YOUSIF, AXIN2, BAP1, BARD1, BMPR1A, BRCA1, BRCA2, BRIP1, CDH1, CDK4, CDKN1B, CDKN2A, CHEK2, CTNNA1, DICER1, EGLN1, EPCAM, FH, FLCN, GREM1, HOXB13, KIF1B, KIT, MAX, MEN1, MET, MITF, MLH1, MSH2, MSH3, MSH6, MUTYH, NBN, NF1, NTHL1, PALB2, PMS2, POLD1, POLE, POT1, PTEN, RAD50, RAD51C, RAD51D, RB1, RECQL, RET, SDHA, SDHAF2, SDHB, SDHC, SDHD, SMAD4, SMARCA4, STK11, SLGU898, TP53, TSC1, TSC2, VHL      Marli will be notified via [employer] once results are available.

## 2024-02-06 NOTE — TELEPHONE ENCOUNTER
Upon review of the In Basket request we were able to locate, review, and update the patient chart as requested for Diabetic Eye Exam.    Any additional questions or concerns should be emailed to the Practice Liaisons via the appropriate education email address, please do not reply via In Basket.    Thank you  Nora Bowers MA

## 2024-02-21 DIAGNOSIS — E11.9 TYPE 2 DIABETES MELLITUS WITHOUT COMPLICATION, WITHOUT LONG-TERM CURRENT USE OF INSULIN (HCC): ICD-10-CM

## 2024-02-21 RX ORDER — ORAL SEMAGLUTIDE 7 MG/1
TABLET ORAL
Qty: 90 TABLET | Refills: 1 | Status: SHIPPED | OUTPATIENT
Start: 2024-02-21

## 2024-02-21 RX ORDER — BLOOD SUGAR DIAGNOSTIC
1 STRIP MISCELLANEOUS DAILY
Qty: 100 STRIP | Refills: 3 | Status: SHIPPED | OUTPATIENT
Start: 2024-02-21

## 2024-03-20 ENCOUNTER — OFFICE VISIT (OUTPATIENT)
Dept: FAMILY MEDICINE CLINIC | Facility: CLINIC | Age: 54
End: 2024-03-20
Payer: COMMERCIAL

## 2024-03-20 VITALS
TEMPERATURE: 99.2 F | DIASTOLIC BLOOD PRESSURE: 82 MMHG | HEART RATE: 90 BPM | HEIGHT: 64 IN | BODY MASS INDEX: 32.4 KG/M2 | RESPIRATION RATE: 18 BRPM | SYSTOLIC BLOOD PRESSURE: 126 MMHG | WEIGHT: 189.8 LBS

## 2024-03-20 DIAGNOSIS — E78.5 HYPERLIPIDEMIA, UNSPECIFIED HYPERLIPIDEMIA TYPE: ICD-10-CM

## 2024-03-20 DIAGNOSIS — E11.9 TYPE 2 DIABETES MELLITUS WITHOUT COMPLICATION, WITHOUT LONG-TERM CURRENT USE OF INSULIN (HCC): ICD-10-CM

## 2024-03-20 DIAGNOSIS — J06.9 ACUTE URI: Primary | ICD-10-CM

## 2024-03-20 PROCEDURE — 99214 OFFICE O/P EST MOD 30 MIN: CPT | Performed by: NURSE PRACTITIONER

## 2024-03-20 RX ORDER — CEFUROXIME AXETIL 500 MG/1
500 TABLET ORAL EVERY 12 HOURS SCHEDULED
Qty: 14 TABLET | Refills: 0 | Status: SHIPPED | OUTPATIENT
Start: 2024-03-20 | End: 2024-03-27

## 2024-03-20 RX ORDER — FLUTICASONE PROPIONATE 50 MCG
2 SPRAY, SUSPENSION (ML) NASAL DAILY
Qty: 16 G | Refills: 0 | Status: SHIPPED | OUTPATIENT
Start: 2024-03-20

## 2024-03-20 NOTE — PATIENT INSTRUCTIONS
Cefuroxime (By mouth)   Cefuroxime Axetil (elc-qr-IZT-eem AX-e-til)  Treats infections. This medicine is a cephalosporin antibiotic.   Brand Name(s): Ceftin   There may be other brand names for this medicine.  When This Medicine Should Not Be Used:   This medicine is not right for everyone. Do not use it if you had an allergic reaction to cefuroxime or similar medicine, including penicillin antibiotics.  How to Use This Medicine:   Liquid, Tablet  Your doctor will tell you how much medicine to use. Do not use more than directed.  Tablet: Swallow whole. Do not break, crush, or chew it. The medicine tastes bitter when it is broken or crushed. If you cannot swallow the tablet whole, tell your doctor.  Oral liquid: Shake well just before you measure each dose. Measure the medicine with a marked measuring spoon, oral syringe, or medicine cup. It is best to take this medicine with food or milk.  If you change from one form of this medicine to another, make sure you understand how much medicine to take. The dose for the tablet is not the same as the dose for the oral liquid form.  Take all of the medicine in your prescription to clear up your infection, even if you feel better after the first few doses.  Missed dose: Take a dose as soon as you remember. If it is almost time for your next dose, wait until then and take a regular dose. Do not take extra medicine to make up for a missed dose.  Tablets:Store the medicine in a closed container at room temperature, away from heat, moisture, and direct light.  Oral liquid: Store in the refrigerator. Do not freeze. Throw away any unused medicine after 10 days.  Drugs and Foods to Avoid:   Ask your doctor or pharmacist before using any other medicine, including over-the-counter medicines, vitamins, and herbal products.  Some food and medicines can affect how cefuroxime works. Tell your doctor if you are using any of the following:  Probenecid  Birth control pills  Acid reducer  medicine for the stomach  If you take an antacid, take this medicine at least 1 hour before or 2 hours after the antacid.  Warnings While Using This Medicine:   Tell your doctor if you are pregnant or breastfeeding, or if you have kidney disease, liver disease, or allergies.  Tell your doctor if you have phenylketonuria (PKU). The oral liquid contains phenylalanine.  This medicine can cause diarrhea. Call your doctor if the diarrhea becomes severe, does not stop, or is bloody. Do not take any medicine to stop diarrhea until you have talked to your doctor. Diarrhea can occur 2 months or more after you stop taking this medicine.  Tell any doctor or dentist who treats you that you are using this medicine. This medicine may affect certain medical test results.  Call your doctor if your symptoms do not improve or if they get worse.  Possible Side Effects While Using This Medicine:   Call your doctor right away if you notice any of these side effects:  Allergic reaction: Itching or hives, swelling in your face or hands, swelling or tingling in your mouth or throat, chest tightness, trouble breathing  Blistering, peeling, red skin rash  Severe diarrhea  If you notice these less serious side effects, talk with your doctor:   Mild diarrhea or vomiting  If you notice other side effects that you think are caused by this medicine, tell your doctor.   Call your doctor for medical advice about side effects. You may report side effects to FDA at 4-648-FDA-8687  © Copyright Merative 2023 Information is for End User's use only and may not be sold, redistributed or otherwise used for commercial purposes.  The above information is an  only. It is not intended as medical advice for individual conditions or treatments. Talk to your doctor, nurse or pharmacist before following any medical regimen to see if it is safe and effective for you.  Fluticasone (Into the nose)   Fluticasone (skgk-NGR-l-sone)  Treats allergy  symptoms (including runny or stuffy nose) and nasal polyps. This medicine is a corticosteroid.   Brand Name(s): Children's Flonase, ClariSpray, Flonase, Flonase Sensimist, FlutiCare, Good Sense 24-Hour Allergy, Leader Allergy Relief, Quality Choice Allergy Relief, Ticaspray, Veramyst, Xhance   There may be other brand names for this medicine.  When This Medicine Should Not Be Used:   This medicine is not right for everyone. Do not use if you had an allergic reaction to fluticasone.  How to Use This Medicine:   Spray  Take your medicine as directed. Your dose may need to be changed several times to find what works best for you.  This medicine is for use only in the nose. Do not get any of it in your eyes or on your skin. If it does get on these areas, rinse it off right away.  Shake the medicine well just before each use.  Fluticasone propionate and Veramyst®:   Prime the new spray before using it for the first time. To prime, spray 6 times into the air away from the face, or pump the bottle until some of the medicine sprays out. Prime the spray if it has not been used for more than 7 days (or 30 days for Veramyst®) or if the cap has been left off the bottle for 5 days or longer.  Before using the medicine, gently blow your nose to clear the nostrils.  After using the nasal spray, wipe the tip of the bottle with a clean tissue and put the cap back on.  Xhance™:   Prime the new spray before using it for the first time. To prime, gently shake the bottle then spray 7 times into the air away from the face, or until some medicine comes out. If you have not used this medicine for 7 days or longer, prime it again by shaking and spraying 2 times into the air, away from the face.  Insert the tip of the cone-shaped nosepiece deep into the nose to create a seal between the nosepiece and the nostril.  Place the mouthpiece inside your mouth and blow on it. While blowing, push the bottle up to actuate the spray pump.  Continue to  blow through your mouth until the medicine is released, but do not inhale or exhale through the nose.  Repeat in the other nostril for a full dose.  You may need to use this medicine for a few days before you start to feel better.  Read and follow the patient instructions that come with this medicine. Talk to your doctor or pharmacist if you have any questions.  Follow the instructions on the medicine label if you are using this medicine without a prescription.  Missed dose: Take a dose as soon as you remember. If it is almost time for your next dose, wait until then and take a regular dose. Do not take extra medicine to make up for a missed dose.  Keep the bottle tightly closed when not using it. Store at room temperature, away from heat and direct light. Do not freeze or refrigerate. Throw this medicine away after you have used 120 sprays.  Drugs and Foods to Avoid:   Ask your doctor or pharmacist before using any other medicine, including over-the-counter medicines, vitamins, and herbal products.  Do not use this medicine together with ritonavir.  Some medicines can affect how fluticasone works. Tell your doctor if you are using the following:  Conivaptan, nefazodone  Medicine to treat HIV/AIDS (including atazanavir, indinavir, lopinavir, nelfinavir, saquinavir)  Medicine to treat infection (including clarithromycin, itraconazole, ketoconazole, telithromycin, voriconazole)  Warnings While Using This Medicine:   Tell your doctor if you are pregnant or breastfeeding, or if you have liver disease, asthma, any infection (including tuberculosis, herpes infection of the eye), a recent exposure to measles or chickenpox, or a history of cataracts, glaucoma, or osteoporosis. Tell your doctor if you have had nose surgery, a nose injury, or a recent infection in your nose.  This medicine may cause the following problems:  Holes, ulcers, or bleeding inside the nose  Increased risk of infection  Slow wound healing  Cataracts  or glaucoma  Problems with the adrenal glands  Low bone mineral density, which may lead to osteoporosis  Slow growth in children  Your doctor will check your progress and the effects of this medicine at regular visits. Keep all appointments.  Call your doctor if your symptoms do not improve or if they get worse.  Keep all medicine out of the reach of children. Never share your medicine with anyone.  Possible Side Effects While Using This Medicine:   Call your doctor right away if you notice any of these side effects:  Allergic reaction: Itching or hives, swelling in your face or hands, swelling or tingling in your mouth or throat, chest tightness, trouble breathing  Bone pain  Burning, redness, swelling, or irritation around or inside your nose  Dark freckles, skin color changes, coldness, weakness, tiredness, nausea, vomiting, weight loss  Eye pain or vision changes  Fever, chills, cough, sore throat, body aches  Heavy or unexplained bleeding from your nose, bleeding that will not stop  Sores or white patches inside the nose or mouth  Tiredness, weakness, dizziness  If you notice these less serious side effects, talk with your doctor:   Headache  If you notice other side effects that you think are caused by this medicine, tell your doctor.   Call your doctor for medical advice about side effects. You may report side effects to FDA at 3-147-FDA-5756  © Copyright Merative 2023 Information is for End User's use only and may not be sold, redistributed or otherwise used for commercial purposes.  The above information is an  only. It is not intended as medical advice for individual conditions or treatments. Talk to your doctor, nurse or pharmacist before following any medical regimen to see if it is safe and effective for you.

## 2024-03-20 NOTE — ASSESSMENT & PLAN NOTE
Complaint with current regimen and HbA1c at goal  Lab Results   Component Value Date    HGBA1C 6.7 (H) 01/12/2024

## 2024-03-20 NOTE — PROGRESS NOTES
"Assessment/Plan:    1. Acute URI  -     cefuroxime (CEFTIN) 500 mg tablet; Take 1 tablet (500 mg total) by mouth every 12 (twelve) hours for 7 days  -     fluticasone (FLONASE) 50 mcg/act nasal spray; 2 sprays into each nostril daily    2. Type 2 diabetes mellitus without complication, without long-term current use of insulin (HCC)  Assessment & Plan:  Complaint with current regimen and HbA1c at goal  Lab Results   Component Value Date    HGBA1C 6.7 (H) 01/12/2024       3. Hyperlipidemia, unspecified hyperlipidemia type  Assessment & Plan:  Complaint with statin and tolerating it well            Patient Instructions:  Take medication with food.  It is important that you take the entire course of antibiotics prescribed.  May also take a probiotic of your choice to maintain healthy GI megan.    Can take some probiotic and yogurt with the medication.  Supportive care discussed and advised.  Advised to RTO for any worsening and no improvement.   Follow up for no improvement and worsening of conditions.  Patient advised and educated when to see immediate medical care.    Return if symptoms worsen or fail to improve.      Future Appointments   Date Time Provider Department Center   4/24/2024 10:30 AM [facility] 1 [facility]   4/24/2024 11:00 AM [facility] 3 [facility]   5/1/2024  9:00 AM ALESSIA Mariano SURG ONC [facility]   6/6/2024  9:30 AM Kira Garcia MD [facility]           Subjective:      Patient ID: Marli Buitrago is a 53 y.o. female.    Chief Complaint   Patient presents with   • Headache     Symptoms started 1 week ago Sheri Baeza LPN     • post nasal drip   • Cough   • Sore Throat   • Earache     Ear fullness, just got back from a traveling was on a plane         Vitals:  /82   Pulse 90   Temp 99.2 °F (37.3 °C)   Resp 18   Ht 5' 4\" (1.626 m)   Wt 86.1 kg (189 lb 12.8 oz)   LMP 04/15/2022 (Approximate)   BMI 32.58 kg/m²     Patient stated that " started with sore throat, congestion, ear pain, cough and headache couple of days ago. Denies fever, chills and sob. Taking OTC cough medication but no relief. Recently came [place] from [place].  Complaint with medications for chronic illnesses and tolerating it well    Headache  Cough  Associated symptoms include ear pain, headaches and a sore throat. Pertinent negatives include no chills, fever, postnasal drip, rhinorrhea, shortness of breath or wheezing.   Sore Throat   Associated symptoms include coughing, ear pain and headaches. Pertinent negatives include no abdominal pain, congestion, diarrhea, drooling, ear discharge, shortness of breath, trouble swallowing or vomiting.   Earache   Associated symptoms include coughing, headaches and a sore throat. Pertinent negatives include no abdominal pain, diarrhea, ear discharge, hearing loss, rhinorrhea or vomiting.       The following portions of the patient's history were reviewed and updated as appropriate: allergies, current medications, past family history, past medical history, past social history, past surgical history and problem list.      Review of Systems   Constitutional:  Negative for chills, diaphoresis, fatigue, fever and unexpected weight change.   HENT:  Positive for ear pain and sore throat. Negative for congestion, dental problem, drooling, ear discharge, facial swelling, hearing loss, mouth sores, nosebleeds, postnasal drip, rhinorrhea, sinus pressure, sinus pain, sneezing, tinnitus, trouble swallowing and voice change.    Respiratory:  Positive for cough. Negative for chest tightness, shortness of breath and wheezing.    Cardiovascular: Negative.    Gastrointestinal:  Negative for abdominal pain, constipation, diarrhea, nausea and vomiting.   Musculoskeletal: Negative.    Skin: Negative.    Neurological:  Positive for headaches. Negative for dizziness and light-headedness.         Objective:    Social History     Tobacco Use   Smoking Status Never   •  Passive exposure: Never   Smokeless Tobacco Never       Allergies: No Known Allergies      Current Outpatient Medications   Medication Sig Dispense Refill   • cefuroxime (CEFTIN) 500 mg tablet Take 1 tablet (500 mg total) by mouth every 12 (twelve) hours for 7 days 14 tablet 0   • Cholecalciferol (Vitamin D3) 125 MCG (5000 UT) TABS Take 5,000 Units by mouth daily     • Ferrous Sulfate Dried ER (Slow Release Iron) 160 (50 Fe) MG TBCR Take by mouth     • fluticasone (FLONASE) 50 mcg/act nasal spray 2 sprays into each nostril daily 16 g 0   • multivitamin (THERAGRAN) TABS Take 1 tablet by mouth daily     • OneTouch Delica Lancets 30G MISC TAKE 1 BY SUBCUTANEOUS ROUTE 4 TIMES EVERY DAY     • OneTouch Verio test strip USE 1 EACH IN THE MORNING USE AS INSTRUCTED 100 strip 3   • rosuvastatin (CRESTOR) 10 MG tablet Take 1 tablet (10 mg total) by mouth daily at bedtime 90 tablet 3   • semaglutide (Rybelsus) 7 MG tablet TAKE 1 TABLET (7 MG TOTAL) BY MOUTH IN THE MORNING 90 tablet 1   • Turmeric 500 MG CAPS Take by mouth     • Vitamin B Complex-C CAPS Take by mouth       No current facility-administered medications for this visit.          Physical Exam  Vitals reviewed.   Constitutional:       Appearance: Normal appearance. She is well-developed.   HENT:      Head: Normocephalic.      Right Ear: Tympanic membrane, ear canal and external ear normal.      Left Ear: Tympanic membrane, ear canal and external ear normal.      Nose: Mucosal edema present.      Right Sinus: Maxillary sinus tenderness present. No frontal sinus tenderness.      Left Sinus: Maxillary sinus tenderness present. No frontal sinus tenderness.      Mouth/Throat:      Mouth: No oral lesions.      Pharynx: No oropharyngeal exudate or posterior oropharyngeal erythema.   Cardiovascular:      Rate and Rhythm: Normal rate and regular rhythm.      Heart sounds: Normal heart sounds.   Pulmonary:      Effort: Pulmonary effort is normal.      Breath sounds: Normal  breath sounds.   Musculoskeletal:         General: Normal range of motion.      Cervical back: Neck supple.   Lymphadenopathy:      Cervical:      Right cervical: No superficial or posterior cervical adenopathy.     Left cervical: No superficial or posterior cervical adenopathy.   Skin:     General: Skin is warm and dry.   Neurological:      Mental Status: She is alert and oriented to person, place, and time.   Psychiatric:         Behavior: Behavior normal.         Thought Content: Thought content normal.         Judgment: Judgment normal.                     ALESSIA Osorio

## 2024-04-11 DIAGNOSIS — J06.9 ACUTE URI: ICD-10-CM

## 2024-04-11 RX ORDER — FLUTICASONE PROPIONATE 50 MCG
SPRAY, SUSPENSION (ML) NASAL
Qty: 48 ML | Refills: 1 | Status: SHIPPED | OUTPATIENT
Start: 2024-04-11

## 2024-04-24 ENCOUNTER — HOSPITAL ENCOUNTER (OUTPATIENT)
Dept: RADIOLOGY | Facility: HOSPITAL | Age: 54
Discharge: HOME | End: 2024-04-24
Payer: COMMERCIAL

## 2024-04-24 ENCOUNTER — APPOINTMENT (OUTPATIENT)
Dept: RADIOLOGY | Facility: HOSPITAL | Age: 54
End: 2024-04-24
Payer: COMMERCIAL

## 2024-04-24 VITALS — BODY MASS INDEX: 32.44 KG/M2 | WEIGHT: 190 LBS | HEIGHT: 64 IN

## 2024-04-24 DIAGNOSIS — D24.2 FIBROADENOMA OF LEFT BREAST: ICD-10-CM

## 2024-04-24 DIAGNOSIS — N60.02 CYST OF LEFT BREAST: ICD-10-CM

## 2024-04-24 PROCEDURE — 76642 ULTRASOUND BREAST LIMITED: CPT

## 2024-04-24 PROCEDURE — 77066 DX MAMMO INCL CAD BI: CPT

## 2024-04-24 PROCEDURE — G0279 TOMOSYNTHESIS, MAMMO: HCPCS

## 2024-04-25 ENCOUNTER — TELEPHONE (OUTPATIENT)
Dept: SURGICAL ONCOLOGY | Facility: CLINIC | Age: 54
End: 2024-04-25

## 2024-04-25 NOTE — TELEPHONE ENCOUNTER
Called, spoke to patient. Patient cancelled 5/1/24 follow up appointment. Patient had family emergency and will be out of town for about a month. Patient will call back to rescheduled follow up visit. Gave patient the hope line number to return call to schedule.

## 2024-05-16 LAB
ALBUMIN SERPL-MCNC: 4.4 G/DL (ref 3.8–4.9)
ALBUMIN/CREAT UR: 4 MG/G CREAT (ref 0–29)
ALBUMIN/GLOB SERPL: 1.5 {RATIO} (ref 1.2–2.2)
ALP SERPL-CCNC: 100 IU/L (ref 44–121)
ALT SERPL-CCNC: 20 IU/L (ref 0–32)
AST SERPL-CCNC: 21 IU/L (ref 0–40)
BILIRUB SERPL-MCNC: 1 MG/DL (ref 0–1.2)
BUN SERPL-MCNC: 9 MG/DL (ref 6–24)
BUN/CREAT SERPL: 13 (ref 9–23)
CALCIUM SERPL-MCNC: 9.8 MG/DL (ref 8.7–10.2)
CHLORIDE SERPL-SCNC: 105 MMOL/L (ref 96–106)
CHOLEST SERPL-MCNC: 242 MG/DL (ref 100–199)
CO2 SERPL-SCNC: 23 MMOL/L (ref 20–29)
CREAT SERPL-MCNC: 0.69 MG/DL (ref 0.57–1)
CREAT UR-MCNC: 264.2 MG/DL
EGFR: 102 ML/MIN/1.73
ERYTHROCYTE [DISTWIDTH] IN BLOOD BY AUTOMATED COUNT: 13.1 % (ref 11.7–15.4)
EST. AVERAGE GLUCOSE BLD GHB EST-MCNC: 154 MG/DL
GLOBULIN SER-MCNC: 2.9 G/DL (ref 1.5–4.5)
GLUCOSE SERPL-MCNC: 118 MG/DL (ref 70–99)
HBA1C MFR BLD: 7 % (ref 4.8–5.6)
HCT VFR BLD AUTO: 38.6 % (ref 34–46.6)
HDLC SERPL-MCNC: 44 MG/DL
HGB BLD-MCNC: 12.6 G/DL (ref 11.1–15.9)
LDLC SERPL CALC-MCNC: 177 MG/DL (ref 0–99)
LDLC/HDLC SERPL: 4 RATIO (ref 0–3.2)
MCH RBC QN AUTO: 27.3 PG (ref 26.6–33)
MCHC RBC AUTO-ENTMCNC: 32.6 G/DL (ref 31.5–35.7)
MCV RBC AUTO: 84 FL (ref 79–97)
MICROALBUMIN UR-MCNC: 10.8 UG/ML
MICRODELETION SYND BLD/T FISH: NORMAL
PLATELET # BLD AUTO: 298 X10E3/UL (ref 150–450)
POTASSIUM SERPL-SCNC: 4.4 MMOL/L (ref 3.5–5.2)
PROT SERPL-MCNC: 7.3 G/DL (ref 6–8.5)
RBC # BLD AUTO: 4.62 X10E6/UL (ref 3.77–5.28)
SL AMB VLDL CHOLESTEROL CALC: 21 MG/DL (ref 5–40)
SODIUM SERPL-SCNC: 142 MMOL/L (ref 134–144)
TRIGL SERPL-MCNC: 116 MG/DL (ref 0–149)
WBC # BLD AUTO: 7.2 X10E3/UL (ref 3.4–10.8)

## 2024-06-12 ENCOUNTER — OFFICE VISIT (OUTPATIENT)
Dept: FAMILY MEDICINE CLINIC | Facility: CLINIC | Age: 54
End: 2024-06-12
Payer: COMMERCIAL

## 2024-06-12 VITALS
TEMPERATURE: 98 F | DIASTOLIC BLOOD PRESSURE: 82 MMHG | BODY MASS INDEX: 32.74 KG/M2 | HEIGHT: 64 IN | HEART RATE: 96 BPM | RESPIRATION RATE: 18 BRPM | WEIGHT: 191.8 LBS | SYSTOLIC BLOOD PRESSURE: 128 MMHG

## 2024-06-12 DIAGNOSIS — E78.5 HYPERLIPIDEMIA, UNSPECIFIED HYPERLIPIDEMIA TYPE: ICD-10-CM

## 2024-06-12 DIAGNOSIS — E11.9 TYPE 2 DIABETES MELLITUS WITHOUT COMPLICATION, WITHOUT LONG-TERM CURRENT USE OF INSULIN (HCC): Primary | ICD-10-CM

## 2024-06-12 PROCEDURE — 99214 OFFICE O/P EST MOD 30 MIN: CPT | Performed by: INTERNAL MEDICINE

## 2024-06-12 NOTE — ASSESSMENT & PLAN NOTE
Lab Results   Component Value Date    HGBA1C 7.0 (H) 05/15/2024     Control has worsened slightly but is still within goal. Continue rybelsus as ordered.  Will order follow up labs.  Discussed need for good foot and eye care.

## 2024-06-12 NOTE — ASSESSMENT & PLAN NOTE
Reviewed labs with patient and she has not been taking her rosuvastatin, will restart.  Will continue to follow labs for lipid control and LFT's.

## 2024-06-12 NOTE — PROGRESS NOTES
Ambulatory Visit  Name: Marli Buitrago      : 1970      MRN: [id number]  Encounter Provider: Kira Garcia MD  Encounter Date: 2024   Encounter department: [facility]    Assessment & Plan   1. Type 2 diabetes mellitus without complication, without long-term current use of insulin ([facility])  Assessment & Plan:    Lab Results   Component Value Date    HGBA1C 7.0 (H) 05/15/2024     Control has worsened slightly but is still within goal. Continue rybelsus as ordered.  Will order follow up labs.  Discussed need for good foot and eye care.   Orders:  -     Amylase; Future  -     Lipase; Future  -     Amylase  -     Lipase  -     Hemoglobin A1C; Future; Expected date: 10/10/2024  -     Comprehensive metabolic panel; Future; Expected date: 10/10/2024  -     CBC and differential; Future; Expected date: 10/10/2024  -     Lipid Panel with Direct LDL reflex; Future; Expected date: 10/10/2024  -     Hemoglobin A1C  -     Comprehensive metabolic panel  -     CBC and differential  -     Lipid Panel with Direct LDL reflex  2. Hyperlipidemia, unspecified hyperlipidemia type  Assessment & Plan:  Reviewed labs with patient and she has not been taking her rosuvastatin, will restart.  Will continue to follow labs for lipid control and LFT's.   Orders:  -     Hemoglobin A1C; Future; Expected date: 10/10/2024  -     Comprehensive metabolic panel; Future; Expected date: 10/10/2024  -     CBC and differential; Future; Expected date: 10/10/2024  -     Lipid Panel with Direct LDL reflex; Future; Expected date: 10/10/2024  -     Hemoglobin A1C  -     Comprehensive metabolic panel  -     CBC and differential  -     Lipid Panel with Direct LDL reflex       History of Present Illness     She has not been on her rosuvastatin since January.        Review of Systems   Constitutional: Negative.    Respiratory: Negative.     Cardiovascular: Negative.    Endocrine: Negative for polydipsia, polyphagia and polyuria.      Past Medical History:   Diagnosis Date   • Anemia    • Diabetes (HCC)    • Diabetes mellitus (HCC)     type 2     Past Surgical History:   Procedure Laterality Date   • BREAST BIOPSY Left    • BREAST BIOPSY Left     over 10 years ago   •  SECTION     • CHOLECYSTECTOMY     • CHOLECYSTECTOMY LAPAROSCOPIC N/A 2022    Procedure: CHOLECYSTECTOMY LAPAROSCOPIC;  Surgeon: Antione Somers MD;  Location: [facility];  Service: General     Family History   Problem Relation Age of Onset   • Diabetes Mother    • Parkinsonism Mother    • Heart disease Father    • Breast cancer Sister 56   • Breast cancer Sister 72   • No Known Problems Maternal Grandmother    • No Known Problems Maternal Grandfather    • No Known Problems Paternal Grandmother    • No Known Problems Paternal Grandfather    • No Known Problems Brother    • No Known Problems Maternal Aunt      Social History     Tobacco Use   • Smoking status: Never     Passive exposure: Never   • Smokeless tobacco: Never   Vaping Use   • Vaping status: Never Used   Substance and Sexual Activity   • Alcohol use: Never   • Drug use: Never   • Sexual activity: Yes     Partners: Male     Current Outpatient Medications on File Prior to Visit   Medication Sig   • Cholecalciferol (Vitamin D3) 125 MCG (5000 UT) TABS Take 5,000 Units by mouth daily   • Ferrous Sulfate Dried ER (Slow Release Iron) 160 (50 Fe) MG TBCR Take by mouth   • multivitamin (THERAGRAN) TABS Take 1 tablet by mouth daily   • OneTouch Delica Lancets 30G MISC TAKE 1 BY SUBCUTANEOUS ROUTE 4 TIMES EVERY DAY   • OneTouch Verio test strip USE 1 EACH IN THE MORNING USE AS INSTRUCTED   • rosuvastatin (CRESTOR) 10 MG tablet Take 1 tablet (10 mg total) by mouth daily at bedtime (Patient taking differently: Take 10 mg by mouth daily at bedtime Has not taken for the past 3-4 months)   • semaglutide (Rybelsus) 7 MG tablet TAKE 1 TABLET (7 MG TOTAL) BY MOUTH IN THE MORNING   • Turmeric 500 MG CAPS Take by mouth  "  • Vitamin B Complex-C CAPS Take by mouth   • [DISCONTINUED] fluticasone (FLONASE) 50 mcg/act nasal spray SPRAY 2 SPRAYS INTO EACH NOSTRIL EVERY DAY (Patient not taking: Reported on 6/12/2024)     No Known Allergies  Immunization History   Administered Date(s) Administered   • COVID-19 PFIZER VACCINE 0.3 ML IM 03/05/2021, 03/25/2021   • H1N1 Inj Preservative Free 12/04/2009   • Influenza Quadrivalent Preservative Free 3 years and older IM 10/07/2016, 09/21/2018   • Influenza Quadrivalent Recombinant Preservative Free IM 10/30/2019, 10/07/2020, 10/06/2021   • Influenza, recombinant, quadrivalent,injectable, preservative free 10/19/2022   • Influenza, seasonal, injectable, preservative free 09/18/2012, 10/24/2013, 11/20/2014, 10/25/2015, 11/13/2017   • Tdap 02/18/2011, 05/04/2012, 01/18/2024   • Zoster Vaccine Recombinant 10/19/2022, 02/20/2023     Objective     /82   Pulse 96   Temp 98 °F (36.7 °C)   Resp 18   Ht 5' 4\" (1.626 m)   Wt 87 kg (191 lb 12.8 oz)   LMP 04/15/2022 (Approximate)   BMI 32.92 kg/m²     Physical Exam  Constitutional:       Appearance: Normal appearance.   HENT:      Head: Normocephalic and atraumatic.   Eyes:      Pupils: Pupils are equal, round, and reactive to light.   Cardiovascular:      Rate and Rhythm: Normal rate and regular rhythm.      Pulses: no weak pulses.           Dorsalis pedis pulses are 2+ on the right side and 2+ on the left side.      Heart sounds: No murmur heard.     No friction rub. No gallop.   Pulmonary:      Effort: Pulmonary effort is normal.      Breath sounds: Normal breath sounds. No wheezing or rales.   Abdominal:      General: Abdomen is flat. Bowel sounds are normal.      Palpations: Abdomen is soft.      Tenderness: There is no abdominal tenderness.   Musculoskeletal:         General: No swelling.   Feet:      Right foot:      Skin integrity: No ulcer, skin breakdown, erythema, warmth, callus or dry skin.      Left foot:      Skin integrity: No " ulcer, skin breakdown, erythema, warmth, callus or dry skin.   Neurological:      Mental Status: She is alert.     Patient's shoes and socks removed.    Right Foot/Ankle   Right Foot Inspection  Skin Exam: skin normal and skin intact. No dry skin, no warmth, no callus, no erythema, no maceration, no abnormal color, no pre-ulcer, no ulcer and no callus.     Toe Exam: ROM and strength within normal limits.     Sensory   Monofilament testing: intact    Vascular  Capillary refills: < 3 seconds  The right DP pulse is 2+.     Left Foot/Ankle  Left Foot Inspection  Skin Exam: skin normal and skin intact. No dry skin, no warmth, no erythema, no maceration, normal color, no pre-ulcer, no ulcer and no callus.     Toe Exam: ROM and strength within normal limits.     Sensory   Monofilament testing: intact    Vascular  Capillary refills: < 3 seconds  The left DP pulse is 2+.     Assign Risk Category  No deformity present  No loss of protective sensation  No weak pulses  Risk: 0      Administrative Statements

## 2024-06-30 DIAGNOSIS — E78.5 HYPERLIPIDEMIA, UNSPECIFIED HYPERLIPIDEMIA TYPE: Primary | ICD-10-CM

## 2024-06-30 RX ORDER — ROSUVASTATIN CALCIUM 20 MG/1
20 TABLET, COATED ORAL
Qty: 90 TABLET | Refills: 1 | Status: SHIPPED | OUTPATIENT
Start: 2024-06-30

## 2024-08-13 ENCOUNTER — TELEPHONE (OUTPATIENT)
Dept: SURGICAL ONCOLOGY | Facility: CLINIC | Age: 54
End: 2024-08-13

## 2024-08-13 NOTE — TELEPHONE ENCOUNTER
Called and spoke to patient. Patient is scheduled for follow up visit with ALESSIA Tao 8/23/24. Patient's mammo and US were completed April 2024.

## 2024-08-17 DIAGNOSIS — E11.9 TYPE 2 DIABETES MELLITUS WITHOUT COMPLICATION, WITHOUT LONG-TERM CURRENT USE OF INSULIN (HCC): ICD-10-CM

## 2024-08-18 RX ORDER — ORAL SEMAGLUTIDE 7 MG/1
TABLET ORAL
Qty: 90 TABLET | Refills: 1 | Status: SHIPPED | OUTPATIENT
Start: 2024-08-18

## 2024-08-20 LAB
AMYLASE SERPL-CCNC: 72 U/L (ref 31–110)
LIPASE SERPL-CCNC: 49 U/L (ref 14–72)

## 2024-08-23 ENCOUNTER — OFFICE VISIT (OUTPATIENT)
Dept: SURGICAL ONCOLOGY | Facility: CLINIC | Age: 54
End: 2024-08-23
Payer: COMMERCIAL

## 2024-08-23 VITALS
HEART RATE: 92 BPM | DIASTOLIC BLOOD PRESSURE: 80 MMHG | HEIGHT: 64 IN | BODY MASS INDEX: 29.19 KG/M2 | WEIGHT: 171 LBS | TEMPERATURE: 98 F | OXYGEN SATURATION: 98 % | SYSTOLIC BLOOD PRESSURE: 130 MMHG

## 2024-08-23 DIAGNOSIS — R92.8 ABNORMAL FINDINGS ON DIAGNOSTIC IMAGING OF BREAST: Primary | ICD-10-CM

## 2024-08-23 DIAGNOSIS — Z80.3 FAMILY HISTORY OF BREAST CANCER IN FIRST DEGREE RELATIVE: ICD-10-CM

## 2024-08-23 DIAGNOSIS — D24.2 FIBROADENOMA OF LEFT BREAST: ICD-10-CM

## 2024-08-23 PROCEDURE — 99213 OFFICE O/P EST LOW 20 MIN: CPT

## 2024-08-23 NOTE — PROGRESS NOTES
Surgical Oncology Follow Up       [facility]  [address]  [facility] [id number]    Marli Buitrago  1970  [id number]  [facility]  [address]  [facility] [address]    1. Abnormal findings on diagnostic imaging of breast  Assessment & Plan:  Diagnostic imaging shows stability of multiple masses, likely benign complicated cysts.  Will repeat imaging in April, and I will see her again at that time for another clinical breast exam.   Orders:  -     US breast left limited (diagnostic); Future; Expected date: 04/24/2025  -     Mammo diagnostic bilateral w 3d & cad; Future; Expected date: 04/24/2025  2. Fibroadenoma of left breast  3. Family history of breast cancer in first degree relative  Assessment & Plan:  Once we return to screening mammograms, I will continue to see her yearly for a clinical breast exam.         Chief Complaint   Patient presents with    Follow-up       Return in about 9 months (around 5/23/2025) for Office Visit, Imaging - See orders.        History of Present Illness: This is a 53 y/o female who returns to the office today in follow-up for abnormal breast imaging.  She has a biopsy-proven fibroadenoma in the left breast, as well as multiple suspected complicated cyst.  Repeat left breast mammogram and US was performed in April and showed stability of all findings, without any new or concerning findings. She denies any changes on self exam.  She denies any new lumps, skin changes or pain.        Review of Systems   Constitutional:  Negative for activity change, appetite change, fatigue and unexpected weight change.   HENT: Negative.     Respiratory: Negative.     Cardiovascular: Negative.    Gastrointestinal: Negative.  Negative for abdominal pain, nausea and vomiting.   Musculoskeletal: Negative.    Skin: Negative.  Negative for color  change, rash and wound.   Neurological: Negative.  Negative for dizziness and headaches.   Hematological: Negative.  Negative for adenopathy.   Psychiatric/Behavioral: Negative.             Patient Active Problem List   Diagnosis    Type 2 diabetes mellitus without complication (HCC)    Hyperlipidemia    Iron deficiency anemia    Vitamin D deficiency    Fibroadenoma of left breast    Abnormal findings on diagnostic imaging of breast    Family history of breast cancer in first degree relative     Past Medical History:   Diagnosis Date    Anemia     Diabetes (HCC)     Diabetes mellitus (HCC)     type 2     Past Surgical History:   Procedure Laterality Date    BREAST BIOPSY Left     BREAST BIOPSY Left     over 10 years ago     SECTION      CHOLECYSTECTOMY      CHOLECYSTECTOMY LAPAROSCOPIC N/A 2022    Procedure: CHOLECYSTECTOMY LAPAROSCOPIC;  Surgeon: Antione Somers MD;  Location: [facility];  Service: General     Family History   Problem Relation Age of Onset    Diabetes Mother     Parkinsonism Mother     Heart disease Father     Breast cancer Sister 56    Breast cancer Sister 72    No Known Problems Maternal Grandmother     No Known Problems Maternal Grandfather     No Known Problems Paternal Grandmother     No Known Problems Paternal Grandfather     No Known Problems Brother     No Known Problems Maternal Aunt      Social History     Socioeconomic History    Marital status: /Civil Union     Spouse name: Not on file    Number of children: Not on file    Years of education: Not on file    Highest education level: Not on file   Occupational History    Not on file   Tobacco Use    Smoking status: Never     Passive exposure: Never    Smokeless tobacco: Never   Vaping Use    Vaping status: Never Used   Substance and Sexual Activity    Alcohol use: Never    Drug use: Never    Sexual activity: Yes     Partners: Male   Other Topics Concern    Not on file   Social History Narrative    Not on file      Social Determinants of Health     Financial Resource Strain: Not on file   Food Insecurity: Not on file   Transportation Needs: Not on file   Physical Activity: Not on file   Stress: Not on file   Social Connections: Not on file   Intimate Partner Violence: Not on file   Housing Stability: Not on file       Current Outpatient Medications:     Cholecalciferol (Vitamin D3) 125 MCG (5000 UT) TABS, Take 5,000 Units by mouth daily, Disp: , Rfl:     Ferrous Sulfate Dried ER (Slow Release Iron) 160 (50 Fe) MG TBCR, Take by mouth, Disp: , Rfl:     multivitamin (THERAGRAN) TABS, Take 1 tablet by mouth daily, Disp: , Rfl:     OneTouch Delica Lancets 30G MISC, TAKE 1 BY SUBCUTANEOUS ROUTE 4 TIMES EVERY DAY, Disp: , Rfl:     OneTouch Verio test strip, USE 1 EACH IN THE MORNING USE AS INSTRUCTED, Disp: 100 strip, Rfl: 3    rosuvastatin (CRESTOR) 10 MG tablet, Take 1 tablet (10 mg total) by mouth daily at bedtime (Patient taking differently: Take 10 mg by mouth daily at bedtime Has not taken for the past 3-4 months), Disp: 90 tablet, Rfl: 3    Rybelsus 7 MG tablet, TAKE 1 TABLET (7 MG TOTAL) BY MOUTH IN THE MORNING, Disp: 90 tablet, Rfl: 1    Turmeric 500 MG CAPS, Take by mouth, Disp: , Rfl:     Vitamin B Complex-C CAPS, Take by mouth, Disp: , Rfl:     rosuvastatin (CRESTOR) 20 MG tablet, TAKE 1 TABLET BY MOUTH DAILY AT BEDTIME, Disp: 90 tablet, Rfl: 1  No Known Allergies  Vitals:    08/23/24 0858   BP: 130/80   Pulse: 92   Temp: 98 °F (36.7 °C)   SpO2: 98%       Physical Exam  Vitals reviewed.   Constitutional:       General: She is not in acute distress.     Appearance: Normal appearance. She is normal weight. She is not ill-appearing or toxic-appearing.   HENT:      Head: Normocephalic and atraumatic.      Nose: Nose normal.      Mouth/Throat:      Mouth: Mucous membranes are moist.   Eyes:      General: No scleral icterus.  Cardiovascular:      Rate and Rhythm: Normal rate.   Pulmonary:      Effort: Pulmonary effort is  normal.   Chest:      Comments: Breasts are generally smooth and symmetric bilaterally. There are no new masses, nodules, skin changes or tenderness on exam. I do not appreciate any adenopathy.  Musculoskeletal:         General: Normal range of motion.      Cervical back: Normal range of motion and neck supple.   Lymphadenopathy:      Cervical: No cervical adenopathy.      Upper Body:      Right upper body: No supraclavicular, axillary or pectoral adenopathy.      Left upper body: No supraclavicular, axillary or pectoral adenopathy.   Skin:     General: Skin is warm and dry.   Neurological:      General: No focal deficit present.      Mental Status: She is alert and oriented to person, place, and time.   Psychiatric:         Mood and Affect: Mood normal.         Behavior: Behavior normal.         Thought Content: Thought content normal.         Judgment: Judgment normal.           Imaging  Mammo diagnostic bilateral w 3d & cad  US breast left limited (diagnostic)  04/24/2024  Narrative & Impression   DIAGNOSIS: Cyst of left breast; Fibroadenoma of left breast      TECHNIQUE:   Digital diagnostic mammography was performed. Computer Aided Detection (CAD) analyzed all applicable images.  Left breast ultrasound was performed.      COMPARISONS: Prior breast imaging dated: 10/19/2023, 10/19/2023, 08/15/2023, 04/04/2023, 04/04/2023, 03/09/2023, 03/09/2023, 02/14/2023, 02/14/2023, 10/15/2021, 01/08/2021, 10/09/2020, 04/12/2019, 11/20/2017, 11/07/2017, 11/07/2017, and 11/07/2017     RELEVANT HISTORY:   Family Breast Cancer History: History of breast cancer in Sister, Sister.  Family Medical History: Family medical history includes breast cancer in 2 relatives (sister, sister).   Personal History: No known relevant hormone history. Surgical history includes breast biopsy. No known relevant medical history.     RISK ASSESSMENT:   5 Year Tyrer-Cuzick: 1.72%  10 Year Tyrer-Cuzick: 3.79%  Lifetime Tyrer-Cuzick: 12.43%     TISSUE  DENSITY:   The breasts are almost entirely fatty.     INDICATION: Marli Buitrago is a 54 y.o. female presenting for abnormal breast imaging.     FINDINGS:   Bilateral  Mammo diagnostic bilateral w 3d & cad  There are no suspicious masses, grouped microcalcifications or areas of unexplained architectural distortion. The skin and nipple areolar complex are unremarkable.      On mammography there are multiple circumscribed nodules bilaterally, greatest on the left.  These are essentially stable for 1 year at this time.  Ultrasound also shows multiple nodules, which are difficult to accurately correlate by exact positions but also appear to suggest likely benign etiologies and stability.  A total of 3 prior biopsy clips are noted on the left.  Recommend additional diagnostic mammogram in 1 year bilaterally to confirm continued stability of these low suspicion findings.  Given the difficulty with correlating of the ultrasound and mammographic nodules, recommend ultrasound at that time as well to assess the nodules in the 12:00 6 cm, 11:00 6 cm, 4:00 4 cm locations.     IMPRESSION:  Stable low suspicion nodules.  Recommend additional follow-up in 1 year to complete a 2-year surveillance.     ASSESSMENT/BI-RADS CATEGORY:  Overall: 3 - Probably Benign     RECOMMENDATION:       - Ultrasound in 1 year for the left breast.       - Diagnostic mammogram in 1 year for both breasts.        I personally reviewed and interpreted the above imaging data.

## 2024-08-23 NOTE — ASSESSMENT & PLAN NOTE
Diagnostic imaging shows stability of multiple masses, likely benign complicated cysts.  Will repeat imaging in April, and I will see her again at that time for another clinical breast exam.

## 2024-08-23 NOTE — ASSESSMENT & PLAN NOTE
Once we return to screening mammograms, I will continue to see her yearly for a clinical breast exam.

## 2024-10-22 ENCOUNTER — TELEPHONE (OUTPATIENT)
Age: 54
End: 2024-10-22

## 2024-10-22 DIAGNOSIS — E78.5 HYPERLIPIDEMIA, UNSPECIFIED HYPERLIPIDEMIA TYPE: ICD-10-CM

## 2024-10-22 NOTE — TELEPHONE ENCOUNTER
The patient called to inform that the medication rosuvastatin (CRESTOR) 20 MG tablet has to change the mg to 10mg.    Please advice

## 2024-10-23 RX ORDER — ROSUVASTATIN CALCIUM 10 MG/1
10 TABLET, COATED ORAL
Qty: 90 TABLET | Refills: 3 | Status: SHIPPED | OUTPATIENT
Start: 2024-10-23

## 2024-11-11 ENCOUNTER — TELEPHONE (OUTPATIENT)
Age: 54
End: 2024-11-11

## 2024-11-11 DIAGNOSIS — E55.9 VITAMIN D DEFICIENCY: Primary | ICD-10-CM

## 2024-11-11 NOTE — TELEPHONE ENCOUNTER
Patient called in asking if she could also have a vitamin D and Iron check added to lab orders and have those sent to [facility] on [facility] .    Other lab orders have been faxed to [facility] at [phone]    Please advise patient once placed, thank you

## 2024-11-15 LAB — 25(OH)D3+25(OH)D2 SERPL-MCNC: 42 NG/ML (ref 30–100)

## 2024-11-18 ENCOUNTER — OFFICE VISIT (OUTPATIENT)
Dept: FAMILY MEDICINE CLINIC | Facility: CLINIC | Age: 54
End: 2024-11-18
Payer: COMMERCIAL

## 2024-11-18 ENCOUNTER — RESULTS FOLLOW-UP (OUTPATIENT)
Dept: FAMILY MEDICINE CLINIC | Facility: CLINIC | Age: 54
End: 2024-11-18

## 2024-11-18 VITALS
SYSTOLIC BLOOD PRESSURE: 122 MMHG | TEMPERATURE: 97.4 F | RESPIRATION RATE: 18 BRPM | OXYGEN SATURATION: 98 % | BODY MASS INDEX: 32.61 KG/M2 | HEIGHT: 64 IN | DIASTOLIC BLOOD PRESSURE: 84 MMHG | HEART RATE: 88 BPM | WEIGHT: 191 LBS

## 2024-11-18 DIAGNOSIS — E11.9 TYPE 2 DIABETES MELLITUS WITHOUT COMPLICATION, WITHOUT LONG-TERM CURRENT USE OF INSULIN (HCC): Primary | ICD-10-CM

## 2024-11-18 DIAGNOSIS — D50.8 OTHER IRON DEFICIENCY ANEMIA: ICD-10-CM

## 2024-11-18 DIAGNOSIS — Z23 ENCOUNTER FOR IMMUNIZATION: ICD-10-CM

## 2024-11-18 DIAGNOSIS — Z23 NEED FOR COVID-19 VACCINE: ICD-10-CM

## 2024-11-18 DIAGNOSIS — E78.5 HYPERLIPIDEMIA, UNSPECIFIED HYPERLIPIDEMIA TYPE: ICD-10-CM

## 2024-11-18 LAB — SL AMB POCT HEMOGLOBIN AIC: 6.9 (ref ?–6.5)

## 2024-11-18 PROCEDURE — 90480 ADMN SARSCOV2 VAC 1/ONLY CMP: CPT | Performed by: INTERNAL MEDICINE

## 2024-11-18 PROCEDURE — 91320 SARSCV2 VAC 30MCG TRS-SUC IM: CPT | Performed by: INTERNAL MEDICINE

## 2024-11-18 PROCEDURE — 90673 RIV3 VACCINE NO PRESERV IM: CPT | Performed by: INTERNAL MEDICINE

## 2024-11-18 PROCEDURE — 90471 IMMUNIZATION ADMIN: CPT | Performed by: INTERNAL MEDICINE

## 2024-11-18 PROCEDURE — 99214 OFFICE O/P EST MOD 30 MIN: CPT | Performed by: INTERNAL MEDICINE

## 2024-11-18 PROCEDURE — 83036 HEMOGLOBIN GLYCOSYLATED A1C: CPT | Performed by: INTERNAL MEDICINE

## 2024-11-18 NOTE — PROGRESS NOTES
Name: Marli Buitrago      : 1970      MRN: [id number]  Encounter Provider: Kiar Garcia MD  Encounter Date: 2024   Encounter department: [facility]  :  Assessment & Plan  Type 2 diabetes mellitus without complication, without long-term current use of insulin (HCC)  Well controlled and she does not feel the side effects from semaglutide are enough to lower the dose at this time.  Will continue as ordered, encouraged exercise and weight loss efforts.     Lab Results   Component Value Date    HGBA1C 6.9 (A) 2024       Orders:    POCT hemoglobin A1c    Comprehensive metabolic panel; Future    CBC and Platelet; Future    Albumin / creatinine urine ratio; Future    Lipid Panel with Direct LDL reflex; Future    Hemoglobin A1C; Future    Hyperlipidemia, unspecified hyperlipidemia type  She states she has been taking rosuvastatin consistently and will check labs for lipids and Lft's. Encouraged to continue daily use.   Orders:    Comprehensive metabolic panel; Future    CBC and Platelet; Future    Albumin / creatinine urine ratio; Future    Lipid Panel with Direct LDL reflex; Future    Hemoglobin A1C; Future    Other iron deficiency anemia  Will rcheck Hb and iron levels, continue otc iron replacement low dose.   Orders:    Iron; Future    Encounter for immunization    Orders:    influenza vaccine, recombinant, PF, 0.5 mL IM (Flublok)    Need for COVID-19 vaccine    Orders:    COVID-19 Pfizer mRNA vaccine 12 yr and older (Comirnaty pre-filled syringe)           History of Present Illness     Here for follow up of Dm and other issues. She is taking her semaglutide and does get a loose BM every time she eats but is not sure this is enough to stop this.  There are no other side effects, denies abdominal pain.  Her sister in law recently passed, and she had been taking care of her, so was not exercising or watching a diet well.  Denies hypoglycemia.  She is scheduling her eye  "exam.      Review of Systems   Constitutional: Negative.    Respiratory: Negative.     Cardiovascular: Negative.    Endocrine: Negative for polydipsia, polyphagia and polyuria.          Objective   /84 (BP Location: Right arm, Patient Position: Sitting, Cuff Size: Large)   Pulse 88   Temp (!) 97.4 °F (36.3 °C) (Probe)   Resp 18   Ht 5' 4\" (1.626 m)   Wt 86.6 kg (191 lb)   LMP 04/15/2022 (Approximate)   SpO2 98%   BMI 32.79 kg/m²      Physical Exam  Constitutional:       Appearance: Normal appearance.   HENT:      Head: Normocephalic and atraumatic.   Eyes:      Pupils: Pupils are equal, round, and reactive to light.   Cardiovascular:      Rate and Rhythm: Normal rate and regular rhythm.      Heart sounds: No murmur heard.     No friction rub. No gallop.   Pulmonary:      Effort: Pulmonary effort is normal.      Breath sounds: Normal breath sounds. No wheezing or rales.   Abdominal:      General: Abdomen is flat. Bowel sounds are normal.      Palpations: Abdomen is soft.      Tenderness: There is no abdominal tenderness.   Musculoskeletal:         General: No swelling.   Neurological:      Mental Status: She is alert.         "

## 2024-11-18 NOTE — ASSESSMENT & PLAN NOTE
Will rcheck Hb and iron levels, continue otc iron replacement low dose.   Orders:    Iron; Future

## 2024-11-18 NOTE — ASSESSMENT & PLAN NOTE
Well controlled and she does not feel the side effects from semaglutide are enough to lower the dose at this time.  Will continue as ordered, encouraged exercise and weight loss efforts.     Lab Results   Component Value Date    HGBA1C 6.9 (A) 11/18/2024       Orders:    POCT hemoglobin A1c    Comprehensive metabolic panel; Future    CBC and Platelet; Future    Albumin / creatinine urine ratio; Future    Lipid Panel with Direct LDL reflex; Future    Hemoglobin A1C; Future

## 2024-11-18 NOTE — ASSESSMENT & PLAN NOTE
She states she has been taking rosuvastatin consistently and will check labs for lipids and Lft's. Encouraged to continue daily use.   Orders:    Comprehensive metabolic panel; Future    CBC and Platelet; Future    Albumin / creatinine urine ratio; Future    Lipid Panel with Direct LDL reflex; Future    Hemoglobin A1C; Future

## 2024-12-17 ENCOUNTER — OFFICE VISIT (OUTPATIENT)
Dept: URGENT CARE | Facility: CLINIC | Age: 54
End: 2024-12-17
Payer: COMMERCIAL

## 2024-12-17 ENCOUNTER — TELEPHONE (OUTPATIENT)
Age: 54
End: 2024-12-17

## 2024-12-17 VITALS
HEART RATE: 110 BPM | TEMPERATURE: 98.5 F | BODY MASS INDEX: 33.12 KG/M2 | DIASTOLIC BLOOD PRESSURE: 74 MMHG | WEIGHT: 194 LBS | RESPIRATION RATE: 16 BRPM | SYSTOLIC BLOOD PRESSURE: 122 MMHG | OXYGEN SATURATION: 98 % | HEIGHT: 64 IN

## 2024-12-17 DIAGNOSIS — R31.9 URINARY TRACT INFECTION WITH HEMATURIA, SITE UNSPECIFIED: Primary | ICD-10-CM

## 2024-12-17 DIAGNOSIS — N39.0 URINARY TRACT INFECTION WITH HEMATURIA, SITE UNSPECIFIED: Primary | ICD-10-CM

## 2024-12-17 DIAGNOSIS — R31.0 GROSS HEMATURIA: ICD-10-CM

## 2024-12-17 LAB
SL AMB  POCT GLUCOSE, UA: ABNORMAL
SL AMB LEUKOCYTE ESTERASE,UA: ABNORMAL
SL AMB POCT BILIRUBIN,UA: ABNORMAL
SL AMB POCT BLOOD,UA: ABNORMAL
SL AMB POCT CLARITY,UA: CLEAR
SL AMB POCT COLOR,UA: ABNORMAL
SL AMB POCT KETONES,UA: ABNORMAL
SL AMB POCT NITRITE,UA: ABNORMAL
SL AMB POCT PH,UA: 6
SL AMB POCT SPECIFIC GRAVITY,UA: 1.01
SL AMB POCT URINE PROTEIN: ABNORMAL
SL AMB POCT UROBILINOGEN: 0.2

## 2024-12-17 PROCEDURE — 81002 URINALYSIS NONAUTO W/O SCOPE: CPT | Performed by: PHYSICIAN ASSISTANT

## 2024-12-17 PROCEDURE — 99213 OFFICE O/P EST LOW 20 MIN: CPT | Performed by: PHYSICIAN ASSISTANT

## 2024-12-17 RX ORDER — CEPHALEXIN 500 MG/1
500 CAPSULE ORAL EVERY 12 HOURS SCHEDULED
Qty: 14 CAPSULE | Refills: 0 | Status: SHIPPED | OUTPATIENT
Start: 2024-12-17 | End: 2024-12-24

## 2024-12-17 NOTE — PATIENT INSTRUCTIONS
Your urine dip came back positive. I will send for culture and follow up if the antibiotic needs to be changed.   I recommend Pyridium over the counter for discomfort. You can take it for 2 days.  I recommend you drink plenty of fluids. Monitor for any worsening symptoms. If you develop worse abdominal pain, back pain, fever/chills, inability to drink liquids or have a decrease in the amount of urine you make go to the Emergency room.

## 2024-12-17 NOTE — TELEPHONE ENCOUNTER
Patient called c/o UTI symptoms, Dysuria, Hematuria, frequency . Denies fever or flank pain. Warm transfer to assist with scheduling or further advisement due to schedule full. Clerical advised Urgent Care or an appointment tomorrow. Patient will proceed to an Urgent care today due to severe symptoms  and discomfort.

## 2024-12-17 NOTE — PROGRESS NOTES
[facility]        NAME: Marli Buitrago is a 54 y.o. female  : 1970    MRN: [id number]  DATE: 2024  TIME: 9:39 AM    Assessment and Plan   Urinary tract infection with hematuria, site unspecified [N39.0, R31.9]  1. Urinary tract infection with hematuria, site unspecified  cephalexin (KEFLEX) 500 mg capsule      2. Gross hematuria  POCT urine dip    Urine culture    Urine culture            Patient Instructions     Patient Instructions   Your urine dip came back positive. I will send for culture and follow up if the antibiotic needs to be changed.   I recommend Pyridium over the counter for discomfort. You can take it for 2 days.  I recommend you drink plenty of fluids. Monitor for any worsening symptoms. If you develop worse abdominal pain, back pain, fever/chills, inability to drink liquids or have a decrease in the amount of urine you make go to the Emergency room.           Follow up with PCP in 3-5 days.  Proceed to  ER if symptoms worsen.    Chief Complaint     Chief Complaint   Patient presents with    Possible UTI     Pt states that yesterday she started with frequency, burning and urgecy. She states she has blood in her urine.          History of Present Illness       The patient is a 54-year-old female presenting today for possible UTI.  Reports starting yesterday with frequency, urgency and dysuria.  Also reports having blood in her urine.  Denies history of UTIs.  Denies any fevers or chills.  Is eating and drinking.        Review of Systems   Review of Systems   Constitutional:  Negative for activity change, appetite change, chills, fatigue and fever.   HENT:  Negative for congestion, ear pain, rhinorrhea, sinus pressure, sinus pain and sore throat.    Eyes:  Negative for pain and visual disturbance.   Respiratory:  Negative for cough, chest tightness and shortness of breath.    Cardiovascular:  Negative for chest pain and palpitations.   Gastrointestinal:  Negative  for abdominal pain, diarrhea, nausea and vomiting.   Genitourinary:  Positive for dysuria, frequency and urgency. Negative for hematuria.   Musculoskeletal:  Negative for arthralgias, back pain and myalgias.   Skin:  Negative for color change, pallor and rash.   Neurological:  Negative for seizures, syncope and headaches.   All other systems reviewed and are negative.        Current Medications       Current Outpatient Medications:     cephalexin (KEFLEX) 500 mg capsule, Take 1 capsule (500 mg total) by mouth every 12 (twelve) hours for 7 days, Disp: 14 capsule, Rfl: 0    Cholecalciferol (Vitamin D3) 125 MCG (5000 UT) TABS, Take 5,000 Units by mouth daily, Disp: , Rfl:     Ferrous Sulfate Dried ER (Slow Release Iron) 160 (50 Fe) MG TBCR, Take by mouth, Disp: , Rfl:     multivitamin (THERAGRAN) TABS, Take 1 tablet by mouth daily, Disp: , Rfl:     OneTouch Delica Lancets 30G MISC, TAKE 1 BY SUBCUTANEOUS ROUTE 4 TIMES EVERY DAY, Disp: , Rfl:     OneTouch Verio test strip, USE 1 EACH IN THE MORNING USE AS INSTRUCTED, Disp: 100 strip, Rfl: 3    rosuvastatin (CRESTOR) 10 MG tablet, Take 1 tablet (10 mg total) by mouth daily at bedtime, Disp: 90 tablet, Rfl: 3    Rybelsus 7 MG tablet, TAKE 1 TABLET (7 MG TOTAL) BY MOUTH IN THE MORNING, Disp: 90 tablet, Rfl: 1    Turmeric 500 MG CAPS, Take by mouth, Disp: , Rfl:     Vitamin B Complex-C CAPS, Take by mouth, Disp: , Rfl:     Current Allergies     Allergies as of 2024    (No Known Allergies)            The following portions of the patient's history were reviewed and updated as appropriate: allergies, current medications, past family history, past medical history, past social history, past surgical history and problem list.     Past Medical History:   Diagnosis Date    Anemia     Diabetes (HCC)     Diabetes mellitus (HCC)     type 2       Past Surgical History:   Procedure Laterality Date    BREAST BIOPSY Left     BREAST BIOPSY Left     over 10 years ago      "SECTION      CHOLECYSTECTOMY      CHOLECYSTECTOMY LAPAROSCOPIC N/A 02/25/2022    Procedure: CHOLECYSTECTOMY LAPAROSCOPIC;  Surgeon: Antione Somers MD;  Location: [facility];  Service: General       Family History   Problem Relation Age of Onset    Diabetes Mother     Parkinsonism Mother     Heart disease Father     Breast cancer Sister 56    Breast cancer Sister 72    No Known Problems Maternal Grandmother     No Known Problems Maternal Grandfather     No Known Problems Paternal Grandmother     No Known Problems Paternal Grandfather     No Known Problems Brother     No Known Problems Maternal Aunt          Medications have been verified.        Objective   /74   Pulse (!) 110   Temp 98.5 °F (36.9 °C)   Resp 16   Ht 5' 4\" (1.626 m)   Wt 88 kg (194 lb)   LMP 04/15/2022 (Approximate)   SpO2 98%   BMI 33.30 kg/m²        Physical Exam     Physical Exam  Constitutional:       General: She is not in acute distress.     Appearance: Normal appearance. She is normal weight. She is not ill-appearing, toxic-appearing or diaphoretic.   HENT:      Head: Normocephalic and atraumatic.   Cardiovascular:      Rate and Rhythm: Normal rate and regular rhythm.      Heart sounds: Normal heart sounds. No murmur heard.     No friction rub. No gallop.   Pulmonary:      Effort: Pulmonary effort is normal. No respiratory distress.      Breath sounds: Normal breath sounds. No stridor. No wheezing, rhonchi or rales.   Chest:      Chest wall: No tenderness.   Abdominal:      General: Abdomen is flat. Bowel sounds are normal. There is no distension.      Palpations: Abdomen is soft. There is no mass.      Tenderness: There is no abdominal tenderness. There is no right CVA tenderness, left CVA tenderness, guarding or rebound.      Hernia: No hernia is present.   Skin:     General: Skin is warm and dry.      Capillary Refill: Capillary refill takes less than 2 seconds.   Neurological:      Mental Status: She is alert. "

## 2024-12-20 LAB
BACTERIA UR CULT: ABNORMAL
Lab: ABNORMAL
SL AMB ANTIMICROBIAL SUSCEPTIBILITY: ABNORMAL

## 2025-02-09 DIAGNOSIS — E11.9 TYPE 2 DIABETES MELLITUS WITHOUT COMPLICATION, WITHOUT LONG-TERM CURRENT USE OF INSULIN (HCC): ICD-10-CM

## 2025-02-10 RX ORDER — ORAL SEMAGLUTIDE 7 MG/1
7 TABLET ORAL EVERY MORNING
Qty: 90 TABLET | Refills: 1 | Status: SHIPPED | OUTPATIENT
Start: 2025-02-10

## 2025-03-10 ENCOUNTER — TELEPHONE (OUTPATIENT)
Age: 55
End: 2025-03-10

## 2025-03-10 NOTE — TELEPHONE ENCOUNTER
JYOTI Kruger 7 MG SUBMITTED     to [employer]     via    []CMM-KEY:    [x]Surescripts-Case ID # [id number]  []Availity-Auth ID #  NDC #    []Faxed to plan   []Other website    []Phone call Case ID #      []PA sent as URGENT    All office notes, labs and other pertaining documents and studies sent. Clinical questions answered. Awaiting determination from insurance company.     Turnaround time for your insurance to make a decision on your Prior Authorization can take 7-21 business days.

## 2025-03-10 NOTE — TELEPHONE ENCOUNTER
Pt. Is coming in April 10th for her annual Physical and wants to know if she needs any other blood work done before she comes in.

## 2025-03-10 NOTE — TELEPHONE ENCOUNTER
JYOTI Kruger 7 MG APPROVED         Patient advised by          [x]MyChart Message  []Phone call   []LMOM  []L/M to call office as no active Communication consent on file  []Unable to leave detailed message as VM not approved on Communication consent       Pharmacy advised by    [x]Fax  []Phone call  []Secure Chat    Specialty Pharmacy    []

## 2025-03-19 ENCOUNTER — TELEPHONE (OUTPATIENT)
Dept: SURGICAL ONCOLOGY | Facility: CLINIC | Age: 55
End: 2025-03-19

## 2025-03-19 NOTE — TELEPHONE ENCOUNTER
Called spoke to patient, she cancelled 5/23/25 follow up with ALESSIA Tao. Patient states she is moving to [place] at the end of April, does not want to reschedule. She said mammo will be done in [place].

## 2025-04-24 ENCOUNTER — HOSPITAL ENCOUNTER (OUTPATIENT)
Dept: RADIOLOGY | Facility: HOSPITAL | Age: 55
Discharge: HOME | End: 2025-04-24
Payer: COMMERCIAL

## 2025-04-24 DIAGNOSIS — R92.8 ABNORMAL FINDINGS ON DIAGNOSTIC IMAGING OF BREAST: ICD-10-CM

## 2025-04-24 PROCEDURE — G0279 TOMOSYNTHESIS, MAMMO: HCPCS

## 2025-04-24 PROCEDURE — 76642 ULTRASOUND BREAST LIMITED: CPT

## 2025-04-24 PROCEDURE — 77066 DX MAMMO INCL CAD BI: CPT

## 2025-05-22 ENCOUNTER — TELEPHONE (OUTPATIENT)
Dept: RADIOLOGY | Facility: HOSPITAL | Age: 55
End: 2025-05-22

## (undated) DEVICE — BASIC DOUBLE BASIN 2-LF: Brand: MEDLINE INDUSTRIES, INC.

## (undated) DEVICE — ANTI-FOG SOLUTION WITH FOAM PAD: Brand: DEVON

## (undated) DEVICE — SYRINGE 10ML LL CONTROL TOP

## (undated) DEVICE — SHEARS MONOPOL MINI 5MM X 31CM RATCHET HNDL

## (undated) DEVICE — TROCAR: Brand: KII® SLEEVE

## (undated) DEVICE — ELECTRODE LAP L WIRE E-Z CLEAN 33CM -0100

## (undated) DEVICE — PACK GENERAL LF

## (undated) DEVICE — TISSUE RETRIEVAL SYSTEM: Brand: INZII RETRIEVAL SYSTEM

## (undated) DEVICE — ADHESIVE SKIN HIGH VISCOSITY EXOFIN 1ML

## (undated) DEVICE — SYRINGE 20ML LL

## (undated) DEVICE — ASTOUND STANDARD SURGICAL GOWN, XL: Brand: CONVERTORS

## (undated) DEVICE — CHLORAPREP HI-LITE 26ML ORANGE

## (undated) DEVICE — TIBURON GENERAL ENDOSCOPY DRAPE: Brand: CONVERTORS

## (undated) DEVICE — ENDOPATH PNEUMONEEDLE INSUFFLATION NEEDLES WITH LUER LOCK CONNECTORS 120MM: Brand: ENDOPATH

## (undated) DEVICE — SUT MONOCRYL 4-0 PS-2 18 IN Y496G

## (undated) DEVICE — DRAPE UTILITY

## (undated) DEVICE — SUT VICRYL 0 UR-6 27 IN J603H

## (undated) DEVICE — IRRIG ENDO FLO TUBING

## (undated) DEVICE — SUT ETHIBOND 0 CT-2 30 IN X412H

## (undated) DEVICE — NEEDLE 25G X 1 1/2

## (undated) DEVICE — TROCAR: Brand: KII FIOS FIRST ENTRY

## (undated) DEVICE — INTENDED FOR TISSUE SEPARATION, AND OTHER PROCEDURES THAT REQUIRE A SHARP SURGICAL BLADE TO PUNCTURE OR CUT.: Brand: BARD-PARKER SAFETY BLADES SIZE 11, STERILE

## (undated) DEVICE — GLOVE SRG BIOGEL 8

## (undated) DEVICE — LIGAMAX 5 MM ENDOSCOPIC MULTIPLE CLIP APPLIER: Brand: LIGAMAX

## (undated) DEVICE — SMOKE REMOVAL SYSTEM: Brand: BOEHRINGER® SMOKE REMOVAL SYSTEM

## (undated) DEVICE — GLOVE INDICATOR PI UNDERGLOVE SZ 7.5 BLUE